# Patient Record
Sex: MALE | Race: WHITE
[De-identification: names, ages, dates, MRNs, and addresses within clinical notes are randomized per-mention and may not be internally consistent; named-entity substitution may affect disease eponyms.]

---

## 2017-08-05 ENCOUNTER — HOSPITAL ENCOUNTER (EMERGENCY)
Dept: HOSPITAL 80 - CED | Age: 67
Discharge: HOME | End: 2017-08-05
Payer: COMMERCIAL

## 2017-08-05 VITALS
OXYGEN SATURATION: 96 % | TEMPERATURE: 98.2 F | SYSTOLIC BLOOD PRESSURE: 132 MMHG | DIASTOLIC BLOOD PRESSURE: 79 MMHG | RESPIRATION RATE: 18 BRPM | HEART RATE: 78 BPM

## 2017-08-05 DIAGNOSIS — I10: ICD-10-CM

## 2017-08-05 DIAGNOSIS — R53.83: Primary | ICD-10-CM

## 2017-08-05 LAB
% IMMATURE GRANULYOCYTES: 0.2 % (ref 0–1.1)
ABSOLUTE IMMATURE GRANULOCYTES: 0.01 10^3/UL (ref 0–0.1)
ABSOLUTE NRBC COUNT: 0 10^3/UL (ref 0–0.01)
ADD DIFF?: NO
ADD MORPH?: NO
ADD SCAN?: NO
ANION GAP SERPL CALC-SCNC: 11 MEQ/L (ref 8–16)
ATYPICAL LYMPHOCYTE FLAG: 60 (ref 0–99)
BACTERIA #/AREA URNS HPF: (no result) /HPF
CALCIUM SERPL-MCNC: 8.7 MG/DL (ref 8.5–10.4)
CHLORIDE SERPL-SCNC: 104 MEQ/L (ref 97–110)
CO2 SERPL-SCNC: 24 MEQ/L (ref 22–31)
COLOR UR: YELLOW
CREAT SERPL-MCNC: 0.8 MG/DL (ref 0.7–1.3)
ERYTHROCYTE [DISTWIDTH] IN BLOOD BY AUTOMATED COUNT: 12.3 % (ref 11.5–15.2)
FRAGMENT RBC FLAG: 0 (ref 0–99)
GFR SERPL CREATININE-BSD FRML MDRD: > 60 ML/MIN/{1.73_M2}
GLUCOSE SERPL-MCNC: 111 MG/DL (ref 70–100)
HCT VFR BLD CALC: 43.5 % (ref 40–51)
HGB BLD-MCNC: 15.1 G/DL (ref 13.7–17.5)
LEFT SHIFT FLG: 0 (ref 0–99)
LIPEMIA HEMOLYSIS FLAG: 90 (ref 0–99)
MCH RBC BLDCO QN: 29.4 PG (ref 27.9–34.1)
MCHC RBC AUTO-ENTMCNC: 34.7 G/DL (ref 32.4–36.7)
MCV RBC AUTO: 84.8 FL (ref 81.5–99.8)
MUCOUS THREADS #/AREA URNS LPF: (no result) /LPF
NITRITE UR QL STRIP: NEGATIVE
NRBC-AUTO%: 0 % (ref 0–0.2)
PH UR STRIP: 5 [PH] (ref 5–7.5)
PLATELET # BLD: 246 10^3/UL (ref 150–400)
PLATELET CLUMPS FLAG: 10 (ref 0–99)
PMV BLD AUTO: 9.5 FL (ref 8.7–11.7)
POTASSIUM SERPL-SCNC: 4.3 MEQ/L (ref 3.5–5.2)
RBC # BLD AUTO: 5.13 10^6/UL (ref 4.4–6.38)
RBC #/AREA URNS HPF: (no result) /HPF (ref 0–3)
SODIUM SERPL-SCNC: 139 MEQ/L (ref 134–144)
SP GR UR STRIP: <= 1.005 (ref 1–1.03)
TROPONIN I SERPL-MCNC: < 0.012 NG/ML (ref 0–0.03)
WBC #/AREA URNS HPF: (no result) /HPF (ref 0–3)

## 2017-08-05 NOTE — CPEKG
Heart Rate: 78

RR Interval: 769

P-R Interval: 152

QRSD Interval: 92

QT Interval: 412

QTC Interval: 470

P Axis: 68

QRS Axis: 66

T Wave Axis: 31

EKG Severity - OTHERWISE NORMAL ECG -

EKG Impression: SINUS RHYTHM

EKG Impression: ATRIAL PREMATURE COMPLEX

Electronically Signed By: Jay Pascual 05-Aug-2017 13:31:08

## 2017-08-05 NOTE — EDPHY
H & P


Time Seen by Provider: 08/05/17 12:45


HPI/ROS: 





CHIEF COMPLAINT:  Fatigue





HISTORY OF PRESENT ILLNESS:  Patient had just been feeling much more tired over 

the last 2 weeks.  Feels achy is no energy and symptoms are worsening and 

severe now.  Associated with a feeling of having intermittent low-grade fever, 

intermittent nausea last night which returned.  No vomiting or diarrhea.  No 

skin rash. No double vision or sore throat or dental problems.  No chest pain 

or abdominal pain.





REVIEW OF SYSTEMS:


Eye: no change in vision


ENT: no sore throat


Cardiac: no chest pain or syncope


Pulmonary: no cough or SOB


Abdomen: no vomiting, diarrhea, abdominal pain


Musculoskeletal:  Back pain ongoing


Skin: no rash


Neuro:  HPI no weakness or numbness in extremities, no trouble with balance or 

strength or sensation.  Mild headache, only today.


Constitutional:  HPI 


: no urinary symptoms





A comprehensive 10 point review of systems is otherwise negative aside from 

elements mentioned in the history of present illness.





PAST MEDICAL HISTORY:  Hypertension and high cholesterol.  3 years ago he had 

angiogram in [place], which showed nonobstructive coronary disease.





Social history:  Moved to [place] from [place] 2 years ago, last foreign 

travel was [place] in March of this year.  No known tick exposure, was recently 

in [place].





General Appearance: Alert and conversant, cooperative.


Eyes: No scleral  icterus. 


ENT, Mouth: Normal mucous membranes.  Normal pharynx, no trismus or gum 

swelling.


Respiratory: Normal respiratory effort, breath sounds equal, lungs are clear to 

auscultation.  No rales or rhonchi.


Cardiovascular:  Regular rate and rhythm. No murmur.


Gastrointestinal:  Abdomen is soft and non tender.


Neurological: Alert and oriented x3.   Normally conversant. Face symmetric, 

normal movement and sensation in all extremities.


Skin: Warm and dry, no rashes.


Musculoskeletal: No peripheral edema and no joint swelling. No meningeal signs, 

normal range of motion of the neck.


Psychiatric: Not agitated.





Emergency Department course/MDM:


Chest x-ray, multiple lab tests, UA.  EKG.  Short of breath at night, pretest 

probability for pulmonary embolism low likelihood.


Discussed with TRAVIS Watts for Dr. Santacruz, will add on TSH and West Nile AB and PCP 

office will followup with him and the results of those tests.


1410: results and f/u plan discussed with patient and family.  Discussed that 

no definitive diagnosis established today, I think it is safe for him to follow 

up with his primary care physician early next week.


Smoking Status: Never smoked


Constitutional: 


 Initial Vital Signs











Temperature (C)  37.5 C   08/05/17 12:41


 


Heart Rate  88   08/05/17 12:41


 


Respiratory Rate  16   08/05/17 12:41


 


Blood Pressure  136/87 H  08/05/17 12:41


 


O2 Sat (%)  94   08/05/17 12:41








 











O2 Delivery Mode               Room Air














Allergies/Adverse Reactions: 


 





No Known Allergies Allergy (Unverified 08/05/17 12:45)


 








Home Medications: 














 Medication  Instructions  Recorded


 


amLODIPine BESYLATE  08/05/17














Medical Decision Making





- Diagnostics


EKG Interpretation: 





12-lead EKG interpreted by me; official reading is in trace master.  My 

interpretation is sinus rhythm with atrial premature complex, no ischemic 

changes, rate 78.


Imaging Results: 


 Imaging Impressions





Chest X-Ray  08/05/17 12:58


Impression: Peribronchial thickening suggests airways disease. Lingular segment 

opacity could reflect atelectasis or superimposed pneumonia.








Chest x-ray shows scarring, patient states that he has been evaluated for this 

before, I think it is likely old.


Imaging: I viewed and interpreted images myself


Differential Diagnosis: 





Differential considered including but not limited to viral syndrome, meningitis

, pneumonia, acute coronary syndrome, anemia or leukemia, other metabolic.





- Data Points


Laboratory Results: 


 Laboratory Results





 08/05/17 13:08 





 08/05/17 13:08 





 











  08/05/17 08/05/17 08/05/17





  13:10 13:08 13:08


 


WBC      





    


 


RBC      





    


 


Hgb      





    


 


Hct      





    


 


MCV      





    


 


MCH      





    


 


MCHC      





    


 


RDW      





    


 


Plt Count      





    


 


MPV      





    


 


Neut % (Auto)      





    


 


Lymph % (Auto)      





    


 


Mono % (Auto)      





    


 


Eos % (Auto)      





    


 


Baso % (Auto)      





    


 


Nucleat RBC Rel Count      





    


 


Absolute Neuts (auto)      





    


 


Absolute Lymphs (auto)      





    


 


Absolute Monos (auto)      





    


 


Absolute Eos (auto)      





    


 


Absolute Basos (auto)      





    


 


Absolute Nucleated RBC      





    


 


Immature Gran %      





    


 


Immature Gran #      





    


 


D-Dimer      0.37 ug/mLFEU ug/mLFEU





     (0.00-0.50) 


 


Sodium      





    


 


Potassium      





    


 


Chloride      





    


 


Carbon Dioxide      





    


 


Anion Gap      





    


 


BUN      





    


 


Creatinine      





    


 


Estimated GFR      





    


 


Glucose      





    


 


Calcium      





    


 


Creatine Kinase      





    


 


Troponin I      





    


 


NT-Pro-B Natriuret Pep      





    


 


TSH  Pending     





    


 


Urine Color    YELLOW   





    


 


Urine Appearance    CLEAR   





    


 


Urine pH    5.0   





    (5.0-7.5)  


 


Ur Specific Gravity    <= 1.005   





    (1.002-1.030)  


 


Urine Protein    NEGATIVE   





    (NEGATIVE)  


 


Urine Ketones    NEGATIVE   





    (NEGATIVE)  


 


Urine Blood    TRACE  H   





    (NEGATIVE)  


 


Urine Nitrate    NEGATIVE   





    (NEGATIVE)  


 


Urine Bilirubin    NEGATIVE   





    (NEGATIVE)  


 


Urine Urobilinogen    0.2 EU EU  





    (0.2-1.0)  


 


Ur Leukocyte Esterase    NEGATIVE   





    (NEGATIVE)  


 


Urine RBC    0-1 /hpf /hpf  





    (0-3)  


 


Urine WBC    NONE SEEN /hpf /hpf  





    (0-3)  


 


Ur Epithelial Cells    TRACE /lpf /lpf  





    (NONE-1+)  


 


Urine Bacteria    TRACE /hpf H /hpf  





    (NONE SEEN)  


 


Urine Mucus    TRACE /lpf /lpf  





    (NONE-1+)  


 


Urine Glucose    NEGATIVE   





    (NEGATIVE)  














  08/05/17 08/05/17





  13:08 13:08


 


WBC    4.84 10^3/uL 10^3/uL





    (3.80-9.50) 


 


RBC    5.13 10^6/uL 10^6/uL





    (4.40-6.38) 


 


Hgb    15.1 g/dL g/dL





    (13.7-17.5) 


 


Hct    43.5 % %





    (40.0-51.0) 


 


MCV    84.8 fL fL





    (81.5-99.8) 


 


MCH    29.4 pg pg





    (27.9-34.1) 


 


MCHC    34.7 g/dL g/dL





    (32.4-36.7) 


 


RDW    12.3 % %





    (11.5-15.2) 


 


Plt Count    246 10^3/uL 10^3/uL





    (150-400) 


 


MPV    9.5 fL fL





    (8.7-11.7) 


 


Neut % (Auto)    55.8 % %





    (39.3-74.2) 


 


Lymph % (Auto)    34.9 % %





    (15.0-45.0) 


 


Mono % (Auto)    6.4 % %





    (4.5-13.0) 


 


Eos % (Auto)    2.3 % %





    (0.6-7.6) 


 


Baso % (Auto)    0.4 % %





    (0.3-1.7) 


 


Nucleat RBC Rel Count    0.0 % %





    (0.0-0.2) 


 


Absolute Neuts (auto)    2.70 10^3/uL 10^3/uL





    (1.70-6.50) 


 


Absolute Lymphs (auto)    1.69 10^3/uL 10^3/uL





    (1.00-3.00) 


 


Absolute Monos (auto)    0.31 10^3/uL 10^3/uL





    (0.30-0.80) 


 


Absolute Eos (auto)    0.11 10^3/uL 10^3/uL





    (0.03-0.40) 


 


Absolute Basos (auto)    0.02 10^3/uL 10^3/uL





    (0.02-0.10) 


 


Absolute Nucleated RBC    0.00 10^3/uL 10^3/uL





    (0-0.01) 


 


Immature Gran %    0.2 % %





    (0.0-1.1) 


 


Immature Gran #    0.01 10^3/uL 10^3/uL





    (0.00-0.10) 


 


D-Dimer    





   


 


Sodium  139 mEq/L mEq/L  





   (134-144)  


 


Potassium  4.3 mEq/L mEq/L  





   (3.5-5.2)  


 


Chloride  104 mEq/L mEq/L  





   ()  


 


Carbon Dioxide  24 mEq/l mEq/l  





   (22-31)  


 


Anion Gap  11 mEq/L mEq/L  





   (8-16)  


 


BUN  13 mg/dL mg/dL  





   (7-23)  


 


Creatinine  0.8 mg/dL mg/dL  





   (0.7-1.3)  


 


Estimated GFR  > 60   





   


 


Glucose  111 mg/dL H mg/dL  





   ()  


 


Calcium  8.7 mg/dL mg/dL  





   (8.5-10.4)  


 


Creatine Kinase  79 IU/L IU/L  





   (0-224)  


 


Troponin I  < 0.012 ng/mL ng/mL  





   (0-0.034)  


 


NT-Pro-B Natriuret Pep  131 pg/mL H pg/mL  





   (0-125)  


 


TSH    





   


 


Urine Color    





   


 


Urine Appearance    





   


 


Urine pH    





   


 


Ur Specific Gravity    





   


 


Urine Protein    





   


 


Urine Ketones    





   


 


Urine Blood    





   


 


Urine Nitrate    





   


 


Urine Bilirubin    





   


 


Urine Urobilinogen    





   


 


Ur Leukocyte Esterase    





   


 


Urine RBC    





   


 


Urine WBC    





   


 


Ur Epithelial Cells    





   


 


Urine Bacteria    





   


 


Urine Mucus    





   


 


Urine Glucose    





   











Medications Given: 


 








Discontinued Medications





Sodium Chloride (Ns)  1,000 mls @ 0 mls/hr IV ONCE ONE


   PRN Reason: Wide Open


   Stop: 08/05/17 12:53


   Last Admin: 08/05/17 13:04 Dose:  1,000 mls








Departure





- Departure


Disposition: Home, Routine, Self-Care


Clinical Impression: 


Fatigue


Qualifiers:


 Fatigue type: unspecified Qualified Code(s): R53.83 - Other fatigue





Condition: Good


Instructions:  Fatigue (ED)


Additional Instructions: 


You have a TSH (thyroid test) and West Nile pending; you can call in 48-72 

hours for results to [phone]; also Dr. Santacruz's office will be following up 

on these results as well.


Referrals: 


Lou Santacruz MD [Primary Care Provider] - As per Instructions

## 2017-08-06 ENCOUNTER — HOSPITAL ENCOUNTER (EMERGENCY)
Dept: HOSPITAL 80 - CED | Age: 67
LOS: 1 days | Discharge: HOME | End: 2017-08-07
Payer: COMMERCIAL

## 2017-08-06 DIAGNOSIS — E86.9: ICD-10-CM

## 2017-08-06 DIAGNOSIS — I10: ICD-10-CM

## 2017-08-06 DIAGNOSIS — G44.89: Primary | ICD-10-CM

## 2017-08-06 DIAGNOSIS — B34.9: ICD-10-CM

## 2017-08-06 PROCEDURE — 96374 THER/PROPH/DIAG INJ IV PUSH: CPT

## 2017-08-06 PROCEDURE — 99284 EMERGENCY DEPT VISIT MOD MDM: CPT

## 2017-08-06 PROCEDURE — 96361 HYDRATE IV INFUSION ADD-ON: CPT

## 2017-08-06 PROCEDURE — 96376 TX/PRO/DX INJ SAME DRUG ADON: CPT

## 2017-08-06 NOTE — EDPHY
H & P


Stated Complaint: ongoing headache for 2 weeks,getting worse


Time Seen by Provider: 08/06/17 22:06


HPI/ROS: 





CHIEF COMPLAINT:  Headache and malaise





HISTORY OF PRESENT ILLNESS:  This is a generally healthy 67-year-old male who 

was seen for similar complaints yesterday.  He returns today complaining of 

worsening headache.  Headache is global, a dull sensation.  He has been ill for 

the past 2 weeks with fever, fatigue and myalgias.  Yesterday he developed a 

mild headache.  Today the headache has worsened.  He has been taking Tylenol 

and ibuprofen every 6 hours with no lasting relief.  He has been febrile at home

; mostly "low-grade" temperatures but one temperature recorded today of 100.4 

F. he has not experienced confusion, difficulty with speech, new numbness, new 

weakness, trouble walking, change in vision, or difficulty swallowing.  He has 

not been out of the country.  He did spend a month in [place] not long ago.  He 

is outside a great deal and has been bitten by mosquitoes since returning to 

[place].  He has not had rash.  He denies any respiratory symptoms, abdominal 

pain, diarrhea, constipation, or urinary symptoms.





REVIEW OF SYSTEMS:





A ten point review of systems was performed and is negative with the exception 

of the items mentioned in the HPI.








Source: Patient, Family


Exam Limitations: No limitations





- Personal History


Current Tetanus Diphtheria and Acellular Pertussis (TDAP): Yes





- Medical/Surgical History


Hx Asthma: No


Hx Chronic Respiratory Disease: No


Hx Diabetes: No


Hx Cardiac Disease: No


Hx Renal Disease: No


Hx Cirrhosis: No


Hx Alcoholism: No


Hx HIV/AIDS: No


Hx Splenectomy or Spleen Trauma: No


Other PMH: HTN.  high cholesterol





- Social History


Smoking Status: Never smoked


Additional Social History: 





He lives with his wife for 47 years.  Worked as a contractor.





- Physical Exam


Exam: 





General Appearance:  Alert.  Vital signs reviewed.  Blood pressure 152/90.  

Temperature 37.8.


Eyes:  Pupils equal and round, no conjunctival injection, no discharge. 

Anicteric.


ENT, Mouth:  Mucous membranes are moist, no oropharyngeal erythema or edema.


Neck:  No lymphadenopathy, supple.  No meningeal signs.


Respiratory:  Lungs are clear to auscultation; no wheezes, rales, or rhonchi.


Cardiovascular:  Regular rate and rhythm; no murmur, rub, or gallop.


Gastrointestinal:  Abdomen is soft and nontender, no masses or organomegaly, 

bowel sounds normal.


Skin:  Warm and dry, no rashes on exposed skin, normal color.


Back:  Nontender to palpation over the thoracolumbar spine. No CVAT.


Extremities:  No lower extremity edema, no calf tenderness or swelling.


Neurological:  Alert and oriented.  Moving all four extremities easily and 

equally.


Cranial nerves II through XII are examined and are intact (visual acuity not 

tested).  Strength is 5 over 5 bilaterally with testing of all major motor 

groups.  Sensation is intact to light touch over all 4 extremities.  Deep 

tendon reflexes are 2+ in the biceps and knees bilaterally.  Gait is normal.  

Finger-to-nose is performed accurately.


Psychiatric:  Normal affect.


Constitutional: 


 Initial Vital Signs











Temperature (C)  37.8 C   08/06/17 22:07


 


Heart Rate  87   08/06/17 22:07


 


Respiratory Rate  16   08/06/17 22:07


 


Blood Pressure  152/90 H  08/06/17 22:07


 


O2 Sat (%)  93   08/06/17 22:07








 











O2 Delivery Mode               Room Air














Allergies/Adverse Reactions: 


 





No Known Allergies Allergy (Verified 08/07/17 07:09)


 








Home Medications: 














 Medication  Instructions  Recorded


 


amLODIPine BESYLATE [Norvasc 5 mg 5 mg PO HS 08/05/17





(*)]  


 


Acyclovir 400 mg PO DAILY PRN 08/07/17


 


Atorvastatin Calcium [Lipitor 20 20 mg PO HS 08/07/17





mg (*)]  


 


Herbals/Supplements -Info Only 1 ea PO DAILY 08/07/17














Medical Decision Making


ED Course/Re-evaluation: 





Blood work was performed yesterday.  West Nile testing is pending.





We discussed lumbar puncture.  I do not think that this is bacterial meningitis 

but viral meningitis is a possibility.  He does not have any meningeal signs. 

He understands that viral meningitis would be treated symptomatically.  I do 

not suspect subarachnoid hemorrhage.  We talked about the risks and benefits of 

a lumbar puncture in this situation.  At this point in time he prefers to 

forego this procedure.  He understands that the diagnosis remains unclear.  He 

is able to make his own medical decisions.  We discussed the risks and the 

benefits of lumbar puncture.





His neurologic exam is normal.  His mental status is normal.  There is no 

evidence of encephalopathy.





His main concern tonight is of continued headache and lack of sleep.  He is 

mostly interested in pain control and understands that this is likely a viral 

process.  He will be given 1 L IV normal saline and Dilaudid 0.5 mg IV.





With IV fluids and pain medication he felt better, while enough to return home.

  He is discharged in good condition.  Danger signs were reviewed with both the 

patient and his wife.


Differential Diagnosis: 





I considered a differential diagnosis of headache including but not limited to 

subarachnoid hemorrhage, migraine headache, tension headache and infectious 

causes such as meningitis, pharyngitis and sinusitis.





- Data Points


Medications Given: 


 








Discontinued Medications





Hydrocodone Bitart/Acetaminophen (Norco 5/325mg Prepack#6)  1 btl TAKEHOME 

EDNOW ONE


   Stop: 08/06/17 22:38


   Last Admin: 08/06/17 23:08 Dose:  1 btl


Hydromorphone HCl (Dilaudid)  0.5 mg IVP EDNOW ONE


   Stop: 08/06/17 22:36


   Last Admin: 08/06/17 22:55 Dose:  0.5 mg


Hydromorphone HCl (Dilaudid)  0.5 mg IVP EDNOW ONE


   Stop: 08/06/17 23:36


   Last Admin: 08/06/17 23:48 Dose:  0.5 mg


Sodium Chloride (Ns)  1,000 mls @ 0 mls/hr IV EDNOW ONE; Wide Open


   PRN Reason: Protocol


   Stop: 08/06/17 22:37


   Last Admin: 08/06/17 22:50 Dose:  1,000 mls








Departure





- Departure


Disposition: Home, Routine, Self-Care


Clinical Impression: 


 Viral syndrome





Headache


Qualifiers:


 Headache type: other headache syndrome Qualified Code(s): G44.89 - Other 

headache syndrome





Condition: Good


Instructions:  Acute Headache (ED), Viral Syndrome (ED)


Additional Instructions: 


Adult Pain & Fever Control:


We recommend Acetaminophen (Tylenol) and Ibuprofen (Motrin,Advil) for pain and 

fever control.  When fever is high or pain severe, both drugs can be used at 

the same time, but at different intervals.  Please note the time differences.  


Your dose is:     Acetaminophen [650]mg every 4 to 6 hours


        Ibuprofen [600]mg every [6] hours with food   OR


          


Note:  do not take Acetaminophen with Hydrocodone (Vicodin, Lortab) or Oycodone 

(Percocet).  These medications also contain Acetaminophen.  


No more than 3000mg of Acetaminophen should be taken in 24 hours (for an adult).





Please watch your overall tylenol dose.





Call Dr. Santacruz's office first thing in the morning to arrange to be seen.  Let 

the office staff know that you have been to the emergency department two days 

in a row.





If you are worse--confusion, vomiting, new weakness, new numbness, trouble 

speaking, any new or concerning symptoms--you should be re-evaluated 

immediately (Call 911).  








Referrals: 


Lou Santacruz MD [Primary Care Provider] - As per Instructions

## 2017-08-07 ENCOUNTER — HOSPITAL ENCOUNTER (INPATIENT)
Dept: HOSPITAL 80 - FED | Age: 67
LOS: 7 days | Discharge: HOME HEALTH | DRG: 866 | End: 2017-08-14
Attending: INTERNAL MEDICINE | Admitting: INTERNAL MEDICINE
Payer: COMMERCIAL

## 2017-08-07 VITALS
TEMPERATURE: 99.1 F | OXYGEN SATURATION: 92 % | HEART RATE: 72 BPM | RESPIRATION RATE: 12 BRPM | SYSTOLIC BLOOD PRESSURE: 130 MMHG | DIASTOLIC BLOOD PRESSURE: 72 MMHG

## 2017-08-07 DIAGNOSIS — A92.31: Primary | ICD-10-CM

## 2017-08-07 DIAGNOSIS — I10: ICD-10-CM

## 2017-08-07 DIAGNOSIS — E78.5: ICD-10-CM

## 2017-08-07 DIAGNOSIS — J98.11: ICD-10-CM

## 2017-08-07 LAB
% IMMATURE GRANULYOCYTES: 0.3 % (ref 0–1.1)
ABSOLUTE IMMATURE GRANULOCYTES: 0.03 10^3/UL (ref 0–0.1)
ABSOLUTE NRBC COUNT: 0 10^3/UL (ref 0–0.01)
ADD DIFF?: NO
ADD MORPH?: NO
ADD SCAN?: NO
ANION GAP SERPL CALC-SCNC: 9 MEQ/L (ref 8–16)
APPEARANCE CSF: CLEAR
APPEARANCE CSF: CLEAR
APTT BLD: 26.3 SEC (ref 23–38)
ATYPICAL LYMPHOCYTE FLAG: 30 (ref 0–99)
CALCIUM SERPL-MCNC: 8.8 MG/DL (ref 8.5–10.4)
CHLORIDE SERPL-SCNC: 103 MEQ/L (ref 97–110)
CO2 SERPL-SCNC: 25 MEQ/L (ref 22–31)
COLOR CSF: COLORLESS
COLOR CSF: COLORLESS
CREAT SERPL-MCNC: 0.9 MG/DL (ref 0.7–1.3)
CSF SUPERNATANT: COLORLESS
CSF SUPERNATANT: COLORLESS
ERYTHROCYTE [DISTWIDTH] IN BLOOD BY AUTOMATED COUNT: 12.3 % (ref 11.5–15.2)
FRAGMENT RBC FLAG: 0 (ref 0–99)
GFR SERPL CREATININE-BSD FRML MDRD: > 60 ML/MIN/{1.73_M2}
GLUCOSE SERPL-MCNC: 123 MG/DL (ref 70–100)
HCT VFR BLD CALC: 42 % (ref 40–51)
HGB BLD-MCNC: 14.7 G/DL (ref 13.7–17.5)
INR PPP: 1.14 (ref 0.83–1.16)
LEFT SHIFT FLG: 0 (ref 0–99)
LIPEMIA HEMOLYSIS FLAG: 90 (ref 0–99)
MCH RBC BLDCO QN: 30.1 PG (ref 27.9–34.1)
MCHC RBC AUTO-ENTMCNC: 35 G/DL (ref 32.4–36.7)
MCV RBC AUTO: 85.9 FL (ref 81.5–99.8)
NRBC-AUTO%: 0 % (ref 0–0.2)
PLATELET # BLD: 255 10^3/UL (ref 150–400)
PLATELET CLUMPS FLAG: 0 (ref 0–99)
PMV BLD AUTO: 9.5 FL (ref 8.7–11.7)
POTASSIUM SERPL-SCNC: 4.1 MEQ/L (ref 3.5–5.2)
PROTHROMBIN TIME: 14.5 SEC (ref 12–15)
RBC # BLD AUTO: 4.89 10^6/UL (ref 4.4–6.38)
SODIUM SERPL-SCNC: 137 MEQ/L (ref 134–144)

## 2017-08-07 PROCEDURE — 009U3ZX DRAINAGE OF SPINAL CANAL, PERCUTANEOUS APPROACH, DIAGNOSTIC: ICD-10-PCS | Performed by: RADIOLOGY

## 2017-08-07 RX ADMIN — SODIUM CHLORIDE SCH MLS: 900 INJECTION, SOLUTION INTRAVENOUS at 22:00

## 2017-08-07 RX ADMIN — ATORVASTATIN CALCIUM SCH MG: 20 TABLET, FILM COATED ORAL at 20:54

## 2017-08-07 RX ADMIN — SODIUM CHLORIDE SCH MLS: 900 INJECTION, SOLUTION INTRAVENOUS at 16:57

## 2017-08-07 RX ADMIN — ACETAMINOPHEN PRN MG: 500 TABLET ORAL at 21:03

## 2017-08-07 RX ADMIN — KETOROLAC TROMETHAMINE PRN MG: 15 INJECTION, SOLUTION INTRAMUSCULAR; INTRAVENOUS at 20:55

## 2017-08-07 NOTE — GCON
[f 
rep st]



                                                                    CONSULTATION





INFECTIOUS DISEASES CONSULTATION



REFERRING PHYSICIAN:  Manny Solitario MD



REASON FOR CONSULTATION:  Meningitis.



HISTORY OF PRESENT ILLNESS:  The patient is a 67-year-old male with a past 
medical history of hypertension, hypercholesterolemia, who I am asked to see in 
consultation for meningitis.  The complains developing nausea, fatigue, and 
malaise toward the end of July.  He had intermittent low-grade temperatures 
primarily in the 99 degree range.  Over the last 3 days, he developed abrupt 
onset of severe headache.  He did not obtain any relief with Tylenol or Advil.  
He did have mild associated nausea and threw up this morning.  Headache was 
such that he sought care in the emergency department beginning on 08/05/2017.  
Evaluation at that time showed normal CBC and chemistry profile.  Findings were 
felt to be most compatible with a viral syndrome.  West Nile virus antibodies 
were obtained, and the patient was advised to follow up with his PCP.  He 
continued to have symptoms, and returned to the Emergency Department on 08/06/
2017 with similar symptoms, but persistent and worsening headache.  His 
temperature at home yesterday was noted to be 100.4.  Lumbar puncture was 
discussed with the patient, and the patient prefer to forego LP at that point 
in time with working diagnosis of viral syndrome.  Today, he had persisting 
headache with nausea and 1 episode of vomiting prompting return to Emergency 
Department.  He underwent lumbar puncture earlier today with CSF showing 84 
white blood cells, 14 red blood cells, 88% neutrophils with glucose 69, and 
protein 39.  The patient does note that he has had mosquito exposure, both in 
[place] and in [place] where he spent the month of July.  He did not note any 
tick exposure.  He did spend time in a reservoir while in [place].  No exposure 
to dead animals.  Traveled to [place] in March without incident.  Does have 
grandchildren whom he was with in [place].  The patient has not noted skin rash
, visual change, sore throat, cough, shortness of breath, diarrhea, urinary 
tract symptoms, myalgias or arthralgias.  He has had associated chills.  His 
appetite has been decreased, but he has been able to tolerate p.o. intake.  The 
patient received Phenergan, Dilaudid and morphine in the Emergency Department 
and now feels clinically improved.  Based on the patient's persistent headache 
and findings of meningitis, he has now been admitted for further care.



PAST MEDICAL HISTORY:  The patient does have cold sores periodically; these are 
normally prevented by taking at time of trigger such as sun or wind exposure, 
hypertension, hypercholesterolemia.



PAST SURGICAL HISTORY:  Shoulder surgery, hernia repair.



CURRENT MEDICATIONS:  Norvasc 5 mg p.o. at bedtime, Lipitor 20 mg p.o. at 
bedtime, Lovenox 40 mg subcu daily, Toradol, morphine, Compazine, Phenergan as 
needed.



ALLERGIES:  No known drug allergies.



SOCIAL HISTORY:  The patient does not smoke.  He drinks 1 beer 4-5 times per 
week.  Traveled to [place] as above, mosquito exposure as above.



FAMILY HISTORY:  Hypertension.



REVIEW OF SYSTEMS:  Outside that noted in the HPI, remainder of 10 system 
review is unremarkable.



PHYSICAL EXAMINATION:  VITAL SIGNS:  Temperature maximum 38.3, temperature 
current 37.8, heart rate 89, respiratory rate 18, blood pressure 107/63, oxygen 
saturation 90% on room air.  GENERAL:  The patient is well nourished, well 
developed, in no acute distress.  He appears nontoxic.  HEENT:  There is no 
scleral icterus, conjunctival injection, or conjunctival petechiae.  Oropharynx 
shows no lesions with moist mucous membranes.  Dentition is in fair repair.  
There is no nasal discharge.  There is no tenderness over the frontal, 
maxillary or mastoid areas.  NECK:  Supple without lymphadenopathy or palpable 
thyromegaly.  No meningismus.  CHEST:  Clear to auscultation bilaterally 
without adventitious sounds.  Respiratory effort is normal.  CARDIOVASCULAR:  
Regular rate and rhythm without murmurs, gallops, or rubs.  ABDOMEN:  Soft, 
nontender, nondistended.  There is no palpable organomegaly.  Bowel sounds are 
present.  MUSCULOSKELETAL:  No cyanosis, clubbing, or edema.  SKIN:  No rashes 
present.  No stigmata of endocarditis.  SKIN:  Warm and dry to touch.  
NEUROLOGIC:  The patient is alert and interacts appropriately with examiner.  
Cranial nerves 2-12 are grossly intact.  Sensation is grossly intact.  Motor 
function is intact.  Cerebellar function is intact by finger-nose testing.  The 
patient is able to name stethoscope and ID badge without difficulty.



LABORATORY DATA:  White blood cell count 9.3, hematocrit 42.0, platelets 255, 
neutrophils 84%.  Serum creatinine is 0.9, venous lactate is 1.0, INR 1.1.  
Urinalysis on 08/05/2017 showed 0-1 red blood cells and no white blood cells.  
CSF findings outlined in history of present illness.  Gram stain is negative 
with culture pending.  Blood cultures x2 are pending.  West Nile virus 
antibodies from 08/05/2017 on serum are pending.  CSF, HSV 1, and 2 PCRs are 
pending.  CSF, West Nile virus antibodies are pending.  



Head CT without contrast shows no abnormalities.  Chest x-ray shows 
hypoventilatory changes.



IMPRESSION:  Meningitis:  Clinical presentation and CSF findings consistent 
with meningitis.  The patient has a neutrophilic predominance, although 
absolute CSF count is less than 100.  Suspect this is most likely viral in 
etiology despite neutrophilic predominance.  This can typically be seen with 
enterovirus, which is more common in late summer.  Doubt this represents 
bacterial meningitis based on clinical course and degree of pleocytosis.  Other 
viral etiologies such as HSV, VZV, or West Nile virus also considerations.  No 
clinical findings of encephalitis on examination.



RECOMMENDATIONS:  

1.  Observe off antibiotics and antivirals.

2.  Add to CSF enterovirus PCR and VZV PCR.

3.  Followup CSF studies as available.

4.  Follow up blood cultures as available.

5.  Continue supportive care for headache and nausea.  



Thank you for this consultation.  We will continue to follow the patient with 
you.





Job #:  [id number]

MTDD

## 2017-08-07 NOTE — EDPHY
H & P


Stated Complaint: Fever, h/a ~ 2wks;at [facility] x 2 as well as PCP's office.


HPI/ROS: 





CHIEF COMPLAINT: Persistent headache, fever





HISTORY OF PRESENT ILLNESS: The patient is a 68 y/o male returning to the ED 

for the 3rd time in the last 3 days with a worsening headache.  He's had 

intermittent nausea, fever, and fatigue for the last 2 weeks.  2 days ago he 

developed a headache that has not improved and developed dry heaving today. He 

rates his headache at an 8/10 in severity.  The HA was alleviated temporarily 

by Dilaudid that he received in the ED yesterday. He declined a lumbar puncture 

yesterday for evaluation of meningitis. He denies cough, chest pain, dysuria, 

vision changes. He notes he's had dyspnea that wakes him from sleep for the 

last month. He recently traveled to [place] where he did have several mosquito 

bites, but denies any known tick bites or other infectious exposures. 





REVIEW OF SYSTEMS:


Constitutional: see HPI


Eyes: No visual changes


ENT: No sore throat


Respiratory: see HPI


Cardiac: No chest pain


Gastrointestinal: See HPI


Genitourinary: No hematuria, no dysuria


Musculoskeletal: No leg pain or swelling


Skin: No rash


Neurological: See HPI


Psychiatric: No depression





- Personal History


Current Tetanus Diphtheria and Acellular Pertussis (TDAP): Yes





- Medical/Surgical History


PMH: 





PMH includes:


1. Hypertension


2. Hyperlipidemia





Prior medical records reviewed including ED visit 8/6/17 for the same symptoms.


Hx Asthma: No


Hx Chronic Respiratory Disease: No


Hx Diabetes: No


Hx Cardiac Disease: No


Hx Renal Disease: No


Hx Cirrhosis: No


Hx Alcoholism: No


Hx HIV/AIDS: No


Hx Splenectomy or Spleen Trauma: No


Other PMH: HTN.  high cholesterol





- Social History


Smoking Status: Never smoked


Additional Social History: 





Wife at bedside. Nonsmoker.





- Physical Exam


Exam: 





General Appearance: Alert, appears uncomfortable


Eyes: Pupils equal and round, no conjunctival pallor or injection


ENT, Mouth: Mucous membranes moist


Neck: Normal inspection, supple


Respiratory: Lungs are clear to auscultation


Cardiovascular: Regular rate and rhythm 


Gastrointestinal:  Abdomen is soft and non-tender 


Neurological:  Alert, oriented x3, cranial nerves II through XII intact, motor 5

/5, sensory intact to light touch, normal gait


Skin:  Warm and dry, no rash


Extremities:  Nontender, no pedal edema


Psychiatric: Mood and affect normal





Constitutional: 


 Initial Vital Signs











Temperature (C)  37.1 C   08/07/17 07:10


 


Heart Rate  89   08/07/17 07:10


 


Respiratory Rate  16   08/07/17 07:10


 


Blood Pressure  120/81 H  08/07/17 07:10


 


O2 Sat (%)  92   08/07/17 07:10








 











O2 Delivery Mode               Nasal Cannula


 


O2 (L/minute)                  2














Allergies/Adverse Reactions: 


 





No Known Allergies Allergy (Verified 08/07/17 07:09)


 








Home Medications: 














 Medication  Instructions  Recorded


 


amLODIPine BESYLATE [Norvasc 5 mg 5 mg PO HS 08/05/17





(*)]  


 


Acyclovir 400 mg PO DAILY PRN 08/07/17


 


Atorvastatin Calcium [Lipitor 20 20 mg PO HS 08/07/17





mg (*)]  


 


Herbals/Supplements -Info Only 1 ea PO DAILY 08/07/17














Medical Decision Making





- Diagnostics


Imaging Results: 


CT scan of the brain:  No acute findings.


Chest x-ray:  NAD





Imaging: Discussed imaging studies w/ On call Radiologist, I viewed and 

interpreted images myself


Procedures: 





Procedure: Lumbar puncture


   Indication: headache


After verbal informed consent from patient explaining the risks including 

infection, bleeding, and neurologic damage, a lumbar puncture was performed 

after the patient was prepped and draped in the usual fashion. The back was 

anesthetized with 1% lidocaine. 2 attempts, unsuccessful, will need LP under 

flouroscopy. 





ED Course/Re-evaluation: 





This is a 68 y/o male who presents with a 3-day history of unimproved headache 

and 2 weeks of intermittent nausea, fever, and fatigue. He additionally has 

been experiencing dyspnea that wakes him from sleep for about 1 month. He was 

evaluated at the ED yesterday and his headache was treated symptomatically with 

some temporary relief. Apart from appearing uncomfortable, his exam is 

unremarkable. Vitals are within normal limits. Clinical presentation concerning 

for meningitis, a mosquito-bourne illness, or possibly have a structural CNS 

etiology. In addition to symptom management, I will repeat his chest x-ray due 

to possible early lingular infiltrate seen on prior x-ray. We also plan to 

perform a head CT to rule out structural cause of his symptoms. I discussed 

recommendation for a lumbar puncture if his imaging is negative to rule out 

meningitis. He agrees to this treatment plan. IV established. Labs drawn 

including CBC, CHEM, lactic acid, and cultures. 1mg IV Dilaudid, 4mg IV Zofran, 

and 1L IV NS administered for symptoms. 





Reassessed patient and discussed work up thus far. His is head CT is negative 

and his chest x-ray looks similar to yesterday. I recommended proceeding with 

the lumbar puncture to rule out meningitis, which he agrees to. Additional 4mg 

IV Zofran and 0.5mg IV Dilaudid administered for pain and nausea. 





Lumbar puncture attempted by me, though I was unsuccessful.  Discussed with the 

patient and his wife.  He will need to have LP under fluoroscopy.





9:30 a.m.-reassessment, severe headache has returned.  Awaiting LP under 

fluoroscopy.  Morphine 6 mg IV given.





Noon-reassessment, continues to have severe headache, still awaiting lumbar 

puncture.





1348:  Consulted with hospitalist service. Dr. Herrera accepts admission for 

intractable headache. LP is still pending. 





1339: Patient returned from fluoroscopy. He now has a fever of 38.3C and 

continues to have pain. 1000mg PO Tylenol administered for fever and 6mg IV 

morphine administered for pain.  CSF results consistent with meningitis, most 

likely viral in etiology.  No antibiotics given in the emergency department.  

The patient was stable throughout his emergency department stay.  The headache 

was difficult to control no required multiple doses of IV narcotics.  There is 

no evidence of severe sepsis.


Differential Diagnosis: 





Headache and fever including but not limited to pneumonia, subarachnoid 

hemorrhage, migraine headache, tension headache and infectious causes such as 

pharyngitis and sinusitis.





- Data Points


Laboratory Results: 


 Laboratory Results





 08/07/17 07:40 





 08/07/17 07:40 








Microbiology Results: 


 MICROBIOLOGY





08/07/17 10:27   Cerebral Spinal Fluid   Gram Stain - Final





Medications Given: 


 








Discontinued Medications





Acetaminophen (Tylenol)  650 mg PO EDNOW ONE


   Stop: 08/07/17 13:50


   Last Admin: 08/07/17 13:56 Dose:  650 mg


Hydromorphone HCl (Dilaudid)  1 mg IVP EDNOW ONE


   Stop: 08/07/17 07:31


   Last Admin: 08/07/17 07:37 Dose:  1 mg


Hydromorphone HCl (Dilaudid)  0.5 mg IVP EDNOW ONE


   Stop: 08/07/17 08:28


   Last Admin: 08/07/17 08:27 Dose:  0.5 mg


Sodium Chloride (Ns)  1,000 mls @ 0 mls/hr IV ONCE ONE; Wide [facility]


   PRN Reason: Protocol


   Stop: 08/07/17 07:31


   Last Admin: 08/07/17 07:39 Dose:  1,000 mls


Morphine Sulfate (Morphine)  6 mg IVP Q1H PRN


   PRN Reason: Pain, Severe Unable to Take PO


   Stop: 08/07/17 11:40


   Last Admin: 08/07/17 10:26 Dose:  6 mg


Morphine Sulfate (Morphine)  6 mg IVP EDNOW ONE


   Stop: 08/07/17 13:51


   Last Admin: 08/07/17 14:00 Dose:  6 mg


Ondansetron HCl (Zofran)  4 mg IVP EDNOW ONE


   Stop: 08/07/17 07:31


   Last Admin: 08/07/17 07:38 Dose:  4 mg


Ondansetron HCl (Zofran)  4 mg IVP EDNOW ONE


   Stop: 08/07/17 08:28


   Last Admin: 08/07/17 08:27 Dose:  4 mg


Promethazine HCl (Phenergan)  12.5 mg IVP ONCE ONE


   Stop: 08/07/17 13:51


   Last Admin: 08/07/17 13:57 Dose:  12.5 mg








Departure





- Departure


Disposition: [facility]


Clinical Impression: 


 Meningitis





Condition: Fair


Report Scribed for: Mera Franco


Report Scribed by: Melani Fletcher


Date of Report: 08/07/17


Time of Report: 07:29


Physician Review and Approval Statement: 





08/07/17 07:29


Portions of this note were transcribed by a medical scribe.  I personally 

performed a history, physical exam, medical decision making, and confirmed 

accuracy of information the transcribed note.

## 2017-08-07 NOTE — PDGENHP
History and Physical





- Chief Complaint


 acute headache





- History of Present Illness


 primary care provider:  Dr. Lou Santacruz





Primary pulmonologist:  Dr. Finley





HPI:  67-year-old male presenting with acute headache characterized as severe, 

located globally, 8/10 pain with associated dry heaves, fatigue, fever of 100.4 

F, with onset of symptoms 3 days ago and duration persistent worsening 

thereafter.  These symptoms in the context of approximately 2 weeks of diffuse 

fatigue, myalgias, shortness of breath exacerbated by lying supine as well as 

nonproductive cough.  Prior to the onset of those symptoms, the patient had 

otherwise been feeling well and he had been vacationing in [place].  Prior to 

the onset of symptoms, he had been physically active, ambulating and exercising 

well without any chest pain or shortness of breath.  During the past 3 days, 

the patient has been to the emergency department twice, and received Dilaudid, 

which transiently alleviated his headache.  He also received guidance to 

receive Tylenol and ibuprofen, which has not had any benefit.  In our emergency 

department, he received Phenergan and this seems to be alleviating his nausea, 

resulting in fatigue.





History Information





- Allergies/Home Medication List


Allergies/Adverse Reactions: 








No Known Allergies Allergy (Verified 08/07/17 07:09)


 





Home Medications: 








amLODIPine BESYLATE [Norvasc 5 mg (*)] 5 mg PO HS 08/05/17 [Last Taken 08/06/17]


Acyclovir 400 mg PO DAILY PRN 08/07/17 [Last Taken Unknown]


Atorvastatin Calcium [Lipitor 20 mg (*)] 20 mg PO HS 08/07/17 [Last Taken 08/06/ 17]


Herbals/Supplements -Info Only 1 ea PO DAILY 08/07/17 [Last Taken Unknown]





I have personally reviewed and updated: family history, medical history, social 

history, surgical history





- Past Medical History


hypertension, hyperlipidemia





- Surgical History


Additional surgical history: Shoulder, knee, Achilles





- Family History


Additional family history: no recent sick family contacts, mother with Alzheimer

's dementia





- Social History


Smoking Status: Never smoked


Alcohol Use: Other (regular drinker of beer, has not had an alcoholic beverage 

in 2 weeks)


Drug Use: None


Additional social history: contractor, physically active





Review of Systems


ROS: 10pt was reviewed & negative except for what was stated in HPI & below


Constitutional: Reports: fever, malaise, weakness


Respiratory: Reports: cough


Gastrointestinal: Reports: vomitting, nausea


Neurological: Reports: headache





Physical Exam














Temp Pulse Resp BP Pulse Ox


 


 37.8 C   89   18   107/63   90 L


 


 08/07/17 15:38  08/07/17 15:38  08/07/17 15:38  08/07/17 15:38  08/07/17 15:38











Constitutional: no apparent distress, appears nourished, uncomfortable, No 

chronically ill appearing, No unkempt


Eyes: PERRL, anicteric sclera, EOMI


Ears, Nose, Mouth, Throat: moist mucous membranes, hearing normal, ears appear 

normal, no oral mucosal ulcers, other ( no tonsillar exudate)


Cardiovascular: regular rate and rhythym, no murmur, rub, or gallop, No edema


Respiratory: no respiratory distress, no rales or rhonchi, clear to auscultation


Gastrointestinal: normoactive bowel sounds, soft, non-tender abdomen, no 

palpable masses, No distension


Skin: warm, normal color, No abrasion, No rash


Neurologic: AAOx3, sensation intact bilaterally, No weakness ( motor strength 5/

5 bilateral upper and lower extremities)


Psychiatric: not anxious, not encephalopathic, flat affect, No agitated


Lymph, Heme, Immunologic: other ( tender, 1 cm submandibular lymph nodes 

without any anterior or posterior cervical lymphadenopathy)





Lab Data & Imaging Review





 08/07/17 07:40





 08/07/17 07:40














WBC  9.32 10^3/uL (3.80-9.50)   08/07/17  07:40    


 


RBC  4.89 10^6/uL (4.40-6.38)   08/07/17  07:40    


 


Hgb  14.7 g/dL (13.7-17.5)   08/07/17  07:40    


 


Hct  42.0 % (40.0-51.0)   08/07/17  07:40    


 


MCV  85.9 fL (81.5-99.8)   08/07/17  07:40    


 


MCH  30.1 pg (27.9-34.1)   08/07/17  07:40    


 


MCHC  35.0 g/dL (32.4-36.7)   08/07/17  07:40    


 


RDW  12.3 % (11.5-15.2)   08/07/17  07:40    


 


Plt Count  255 10^3/uL (150-400)   08/07/17  07:40    


 


MPV  9.5 fL (8.7-11.7)   08/07/17  07:40    


 


Neut % (Auto)  83.5 % (39.3-74.2)  H  08/07/17  07:40    


 


Lymph % (Auto)  12.2 % (15.0-45.0)  L  08/07/17  07:40    


 


Mono % (Auto)  3.4 % (4.5-13.0)  L  08/07/17  07:40    


 


Eos % (Auto)  0.4 % (0.6-7.6)  L  08/07/17  07:40    


 


Baso % (Auto)  0.2 % (0.3-1.7)  L  08/07/17  07:40    


 


Nucleat RBC Rel Count  0.0 % (0.0-0.2)   08/07/17  07:40    


 


Absolute Neuts (auto)  7.77 10^3/uL (1.70-6.50)  H  08/07/17  07:40    


 


Absolute Lymphs (auto)  1.14 10^3/uL (1.00-3.00)   08/07/17  07:40    


 


Absolute Monos (auto)  0.32 10^3/uL (0.30-0.80)   08/07/17  07:40    


 


Absolute Eos (auto)  0.04 10^3/uL (0.03-0.40)   08/07/17  07:40    


 


Absolute Basos (auto)  0.02 10^3/uL (0.02-0.10)   08/07/17  07:40    


 


Absolute Nucleated RBC  0.00 10^3/uL (0-0.01)   08/07/17  07:40    


 


Immature Gran %  0.3 % (0.0-1.1)   08/07/17  07:40    


 


Immature Gran #  0.03 10^3/uL (0.00-0.10)   08/07/17  07:40    


 


PT  14.5 SEC (12.0-15.0)   08/07/17  08:55    


 


INR  1.14  (0.83-1.16)   08/07/17  08:55    


 


APTT  26.3 SEC (23.0-38.0)   08/07/17  08:55    


 


VBG Lactic Acid  1.0 mmol/L (0.7-2.1)   08/07/17  08:32    


 


Sodium  137 mEq/L (134-144)   08/07/17  07:40    


 


Potassium  4.1 mEq/L (3.5-5.2)   08/07/17  07:40    


 


Chloride  103 mEq/L ()   08/07/17  07:40    


 


Carbon Dioxide  25 mEq/l (22-31)   08/07/17  07:40    


 


Anion Gap  9 mEq/L (8-16)   08/07/17  07:40    


 


BUN  13 mg/dL (7-23)   08/07/17  07:40    


 


Creatinine  0.9 mg/dL (0.7-1.3)   08/07/17  07:40    


 


Estimated GFR  > 60   08/07/17  07:40    


 


Glucose  123 mg/dL ()  H  08/07/17  07:40    


 


Calcium  8.8 mg/dL (8.5-10.4)   08/07/17  07:40    


 


Fl Pathologist Review  REBECCA ARELLANO MD   08/07/17  10:27    


 


CSF Tube Number  1   08/07/17  10:27    


 


CSF Appearance  CLEAR  (CLEAR)   08/07/17  10:27    


 


CSF Color  COLORLESS  (COLORLESS)   08/07/17  10:27    


 


CSF Supernatant  COLORLESS  (COLORLESS)   08/07/17  10:27    


 


CSF WBC  58 /mm3 (0-5)  H  08/07/17  10:27    


 


CSF RBC  17 /mm3 (0-0)  H  08/07/17  10:27    


 


CSF Neutrophils %  88 % (0-6)  H  08/07/17  10:27    


 


CSF Lymphocytes %  9 % (0-100)   08/07/17  10:27    


 


CSF Monos/Macrophage %  3 % (0-45)   08/07/17  10:27    


 


CSF Glucose  69 mg/dL (50-75)   08/07/17  10:27    


 


CSF Total Protein  39 mg/dL (12-60)   08/07/17  10:27    


 


CSF West Nile IgG Ab  Cancelled   08/07/17  10:26    


 


CSF West Nile IgM Ab  Cancelled   08/07/17  10:26    


 


CSF West Nile Interp  Cancelled   08/07/17  10:26    


 


HSV Source Description  Cancelled   08/07/17  10:26    


 


Herpes Simplex Culture  Cancelled   08/07/17  10:26    


 


HSV I DNA PCR  Cancelled   08/07/17  10:26    


 


HSV II DNA PCR  Cancelled   08/07/17  10:26    








Visualized and Interpreted Chest x-ray results: Yes


Chest X-Ray results: other ( hypoventilation)





Assessment & Plan


Assessment: 








 67-year-old male presents with acute headache and suspected viral encephalitis








Plan: 





1. Suspected viral encephalitis.  Acute, new problem this provider, further 

workup indicated.  Evidenced by CSF demonstrating pleocytosis with a neutrophil 

predominance, in the setting of headache, cognitive fatigue, systemic viral 

syndrome


-outside records reviewed (ED report by Dr. Bela Reveles 8/6, indicating 

patient declined LP, discharged home w/ rec ibuprofen/tylenol for suspected 

viral meningitis/syndrome)


-CSF sent for HSV, West Nile virus


-discussed with Dr. Dez Friend, consult appreciated, he advises that this may be 

secondary to enterovirus and we will not initiate empiric IV acyclovir at this 

time


-supportive care with IV fluids, antiemetics, pain control


-send respiratory viral panel, GS/Cx pending





2. Hypertension.  Chronic, continue home medication





Diet.  Regular as tolerated





Prophylaxis.  Moderate risk patient, Lovenox 40





Code.  Full





Disposition.  Anticipated discharge is uncertain, anticipated length stay is 

greater than 48 hours warranting inpatient admission status for suspected acute 

viral encephalitis requiring close monitoring and reassessment of culture 

results from lumbar puncture.

## 2017-08-08 LAB
% IMMATURE GRANULYOCYTES: 0.3 % (ref 0–1.1)
ABSOLUTE IMMATURE GRANULOCYTES: 0.03 10^3/UL (ref 0–0.1)
ABSOLUTE NRBC COUNT: 0 10^3/UL (ref 0–0.01)
ADD DIFF?: NO
ADD MORPH?: NO
ADD SCAN?: NO
ALBUMIN SERPL-MCNC: 3.2 G/DL (ref 3.5–5)
ALP SERPL-CCNC: 56 IU/L (ref 38–126)
ALT SERPL-CCNC: 36 IU/L (ref 21–72)
ANION GAP SERPL CALC-SCNC: 9 MEQ/L (ref 8–16)
AST SERPL-CCNC: 20 IU/L (ref 17–59)
ATYPICAL LYMPHOCYTE FLAG: 30 (ref 0–99)
BILIRUB SERPL-MCNC: 1.1 MG/DL (ref 0.1–1.4)
CALCIUM SERPL-MCNC: 8.1 MG/DL (ref 8.5–10.4)
CHLORIDE SERPL-SCNC: 107 MEQ/L (ref 97–110)
CO2 SERPL-SCNC: 22 MEQ/L (ref 22–31)
CREAT SERPL-MCNC: 0.9 MG/DL (ref 0.7–1.3)
CRP SERPL-MCNC: 6.7 MG/L (ref ?–10)
ERYTHROCYTE [DISTWIDTH] IN BLOOD BY AUTOMATED COUNT: 12.4 % (ref 11.5–15.2)
ERYTHROCYTE [SEDIMENTATION RATE] IN BLOOD BY WESTERGREN METHOD: 7 MM/HR (ref 0–20)
FRAGMENT RBC FLAG: 0 (ref 0–99)
GFR SERPL CREATININE-BSD FRML MDRD: > 60 ML/MIN/{1.73_M2}
GLUCOSE SERPL-MCNC: 109 MG/DL (ref 70–100)
HCT VFR BLD CALC: 37.6 % (ref 40–51)
HGB BLD-MCNC: 12.8 G/DL (ref 13.7–17.5)
LEFT SHIFT FLG: 0 (ref 0–99)
LIPEMIA HEMOLYSIS FLAG: 90 (ref 0–99)
MCH RBC BLDCO QN: 29.8 PG (ref 27.9–34.1)
MCHC RBC AUTO-ENTMCNC: 34 G/DL (ref 32.4–36.7)
MCV RBC AUTO: 87.4 FL (ref 81.5–99.8)
NRBC-AUTO%: 0 % (ref 0–0.2)
PLATELET # BLD: 228 10^3/UL (ref 150–400)
PLATELET CLUMPS FLAG: 30 (ref 0–99)
PMV BLD AUTO: 9.3 FL (ref 8.7–11.7)
POTASSIUM SERPL-SCNC: 3.8 MEQ/L (ref 3.5–5.2)
PROT SERPL-MCNC: 5.6 G/DL (ref 6.3–8.2)
RBC # BLD AUTO: 4.3 10^6/UL (ref 4.4–6.38)
SODIUM SERPL-SCNC: 138 MEQ/L (ref 134–144)

## 2017-08-08 RX ADMIN — KETOROLAC TROMETHAMINE PRN MG: 15 INJECTION, SOLUTION INTRAMUSCULAR; INTRAVENOUS at 09:17

## 2017-08-08 RX ADMIN — SODIUM CHLORIDE SCH MLS: 900 INJECTION, SOLUTION INTRAVENOUS at 03:59

## 2017-08-08 RX ADMIN — PROMETHAZINE HYDROCHLORIDE PRN MG: 25 INJECTION INTRAMUSCULAR; INTRAVENOUS at 03:56

## 2017-08-08 RX ADMIN — SODIUM CHLORIDE AND POTASSIUM CHLORIDE SCH MLS: 9; 1.49 INJECTION, SOLUTION INTRAVENOUS at 18:34

## 2017-08-08 RX ADMIN — ATORVASTATIN CALCIUM SCH MG: 20 TABLET, FILM COATED ORAL at 21:51

## 2017-08-08 RX ADMIN — KETOROLAC TROMETHAMINE PRN MG: 15 INJECTION, SOLUTION INTRAMUSCULAR; INTRAVENOUS at 15:09

## 2017-08-08 RX ADMIN — ACETAMINOPHEN PRN MG: 500 TABLET ORAL at 05:39

## 2017-08-08 RX ADMIN — ACETAMINOPHEN PRN MG: 500 TABLET ORAL at 18:25

## 2017-08-08 RX ADMIN — ENOXAPARIN SODIUM SCH MG: 100 INJECTION SUBCUTANEOUS at 09:12

## 2017-08-08 RX ADMIN — ACETAMINOPHEN PRN MG: 500 TABLET ORAL at 13:22

## 2017-08-08 RX ADMIN — KETOROLAC TROMETHAMINE PRN MG: 15 INJECTION, SOLUTION INTRAMUSCULAR; INTRAVENOUS at 21:23

## 2017-08-08 NOTE — HOSPPROG
Hospitalist Progress Note


Assessment/Plan: 


Assessment: 





 67-year-old male presents with acute headache and suspected viral meningitis





Plan: 





1. Suspected viral meningitis.  Acute, evidenced by CSF demonstrating 

pleocytosis with a neutrophil predominance, in the setting of headache, 

cognitive fatigue, systemic viral syndrome


-d/w Dr. Friend, suspected enterovirus, serologies sent, pending


-ongoing fever, repeat BCx to ensure no other bacterial cause


-remains very symptomatic w/ severe headache, responding to and receiving IV 

morphine, toradol, rec adding benadryl now


-cont IVF w/ K


-remains very immobile 2/2 discomfort, recommended PT and repositioning to 

prevent severe deconditioning


-will get ESR to eval whether it is at a critical level where TA needs to be 

considered as part of differential





2. Hypertension.  Chronic, continue home medication





Diet.  Regular as tolerated





Prophylaxis.  Moderate risk patient, Lovenox 40





Code.  Full





Disposition.  Anticipated discharge is uncertain, too symptomatic/weak to 

safely discharge home





Subjective: patient with ongoing weakness and headache, counseled patient/wife 

extensively regarding dx, supportive tx, and need to mobilize when able


Objective: 


 Vital Signs











Temp Pulse Resp BP Pulse Ox


 


 36.9 C   66   18   158/78 H  96 


 


 08/08/17 14:45  08/08/17 14:45  08/08/17 14:45  08/08/17 14:45  08/08/17 14:45








 Microbiology











 08/07/17 18:10 Respiratory Panel (PCR) - Final





 Nasal, Sinus - [place] Viral Transport    No Organism Detected








 Laboratory Results





 08/08/17 05:31 





 08/08/17 05:31 





 











 08/07/17 08/08/17 08/09/17





 05:59 05:59 05:59


 


Intake Total  3050 


 


Output Total  525 


 


Balance  2525 








 











PT  14.5 SEC (12.0-15.0)   08/07/17  08:55    


 


INR  1.14  (0.83-1.16)   08/07/17  08:55    














- Time Spent With Patient


Time Spent with Patient: greater than 35 minutes


Time Spent with Patient: Greater than 35 minutes spent on this patients care, 

greater than 50% of time spent counseling, educating, and coordinating care 

regarding the above mentioned plan.





- Physical Exam


Constitutional: no apparent distress, appears nourished, uncomfortable, No not 

in pain (moderate headache)


Eyes: PERRL, anicteric sclera, EOMI


Cardiovascular: regular rate and rhythym, no murmur, rub, or gallop


Respiratory: no respiratory distress, no rales or rhonchi, clear to auscultation


Gastrointestinal: normoactive bowel sounds, soft, non-tender abdomen, no 

palpable masses


Neurologic: AAOx3, sensation intact bilaterally, CN II-XII Intact, No weakness


Psychiatric: not anxious, flat affect, other (lethargic but arousable), No 

agitated





ICD10 Worksheet


Patient Problems: 


 Problems











Problem Status Onset


 


Headache Acute  


 


Meningitis Acute

## 2017-08-08 NOTE — PCMIDPN
Assessment/Plan: 


Assessment/Plan:


* Meningitis:  Clinically with persistent headache and fever this afternoon 

which is not unexpected as part of typical clinical course.  Most likely 

etiology will be viral with considerations including enterovirus, West Nile 

virus, HSV, and VZV.  Remains without findings of encephalitis.  Continue 

supportive care with pain control and IV hydration.  Await serologic data which 

is currently pending.  Will hold off on addition of acyclovir given absence of 

encephalitis.  Discussed with patient and wife clinical findings and continued 

likelihood that etiology will be viral in nature.  Blood and CSF cultures 

remain negative.





08/08/17 15:46





Subjective: 





Patient with fever to 39.1 and headache this afternoon.  Appetite remains poor.

  No confusion.


Objective: 


 Vital Signs











Temp Pulse Resp BP Pulse Ox


 


 36.9 C   66   18   158/78 H  96 


 


 08/08/17 14:45  08/08/17 14:45  08/08/17 14:45  08/08/17 14:45  08/08/17 14:45








 Microbiology











 08/07/17 18:10 Respiratory Panel (PCR) - Final





 Nasal, Sinus - [place] Viral Transport    No Organism Detected








 Laboratory Results





 08/08/17 05:31 





 08/08/17 05:31 





 











 08/07/17 08/08/17 08/09/17





 05:59 05:59 05:59


 


Intake Total  3050 


 


Output Total  525 


 


Balance  2525 








 











ESR  7 MM/HR (0-20)   08/08/17  05:31    


 


C-Reactive Protein  6.7 mg/L (<10.0)   08/08/17  05:31    








No antibiotics


CSF and blood cultures no growth to date


CSF HSV/VZV/West Nile virus/enterovirus pending


Serum West Nile virus antibody from 8/5/2017 pending





- Physical Exam


General Appearance: alert, apparent distress (Uncomfortable due to fever and 

headache), non-toxic


EENT: No scleral icterus, No thrush, No conjunctival petechiae


Respiratory: lungs clear, No respiratory distress


Neck: No meningismus


Cardiac/Chest: regular rate, rhythm, No systolic murmur


Extremities: No inflammation


Abdomen: non-tender, No distended


Skin: No rash


Neuro/Psych: alert, No confused





ICD10 Worksheet


Patient Problems: 


 Problems











Problem Status Onset


 


Headache Acute  


 


Meningitis Acute

## 2017-08-09 LAB
% IMMATURE GRANULYOCYTES: 0.7 % (ref 0–1.1)
ABSOLUTE IMMATURE GRANULOCYTES: 0.06 10^3/UL (ref 0–0.1)
ABSOLUTE NRBC COUNT: 0 10^3/UL (ref 0–0.01)
ADD DIFF?: NO
ADD MORPH?: NO
ADD SCAN?: NO
ALBUMIN SERPL-MCNC: 3 G/DL (ref 3.5–5)
ALP SERPL-CCNC: 55 IU/L (ref 38–126)
ALT SERPL-CCNC: 32 IU/L (ref 21–72)
ANION GAP SERPL CALC-SCNC: 8 MEQ/L (ref 8–16)
AST SERPL-CCNC: 20 IU/L (ref 17–59)
ATYPICAL LYMPHOCYTE FLAG: 20 (ref 0–99)
BILIRUB SERPL-MCNC: 1.1 MG/DL (ref 0.1–1.4)
CALCIUM SERPL-MCNC: 8.4 MG/DL (ref 8.5–10.4)
CHLORIDE SERPL-SCNC: 104 MEQ/L (ref 97–110)
CO2 SERPL-SCNC: 24 MEQ/L (ref 22–31)
CREAT SERPL-MCNC: 0.9 MG/DL (ref 0.7–1.3)
ERYTHROCYTE [DISTWIDTH] IN BLOOD BY AUTOMATED COUNT: 12.4 % (ref 11.5–15.2)
FRAGMENT RBC FLAG: 0 (ref 0–99)
GFR SERPL CREATININE-BSD FRML MDRD: > 60 ML/MIN/{1.73_M2}
GLUCOSE SERPL-MCNC: 103 MG/DL (ref 70–100)
HCT VFR BLD CALC: 37.4 % (ref 40–51)
HGB BLD-MCNC: 13 G/DL (ref 13.7–17.5)
LEFT SHIFT FLG: 0 (ref 0–99)
LIPEMIA HEMOLYSIS FLAG: 90 (ref 0–99)
MCH RBC BLDCO QN: 30.4 PG (ref 27.9–34.1)
MCHC RBC AUTO-ENTMCNC: 34.8 G/DL (ref 32.4–36.7)
MCV RBC AUTO: 87.4 FL (ref 81.5–99.8)
NRBC-AUTO%: 0 % (ref 0–0.2)
PLATELET # BLD: 229 10^3/UL (ref 150–400)
PLATELET CLUMPS FLAG: 0 (ref 0–99)
PMV BLD AUTO: 9.4 FL (ref 8.7–11.7)
POTASSIUM SERPL-SCNC: 3.8 MEQ/L (ref 3.5–5.2)
PROT SERPL-MCNC: 5.7 G/DL (ref 6.3–8.2)
RBC # BLD AUTO: 4.28 10^6/UL (ref 4.4–6.38)
SODIUM SERPL-SCNC: 136 MEQ/L (ref 134–144)

## 2017-08-09 RX ADMIN — SODIUM CHLORIDE AND POTASSIUM CHLORIDE SCH MLS: 9; 1.49 INJECTION, SOLUTION INTRAVENOUS at 18:40

## 2017-08-09 RX ADMIN — ENOXAPARIN SODIUM SCH MG: 100 INJECTION SUBCUTANEOUS at 09:01

## 2017-08-09 RX ADMIN — PROMETHAZINE HYDROCHLORIDE PRN MG: 25 INJECTION INTRAMUSCULAR; INTRAVENOUS at 16:07

## 2017-08-09 RX ADMIN — KETOROLAC TROMETHAMINE PRN MG: 15 INJECTION, SOLUTION INTRAMUSCULAR; INTRAVENOUS at 18:39

## 2017-08-09 RX ADMIN — ACETAMINOPHEN PRN MG: 500 TABLET ORAL at 09:01

## 2017-08-09 RX ADMIN — KETOROLAC TROMETHAMINE PRN MG: 15 INJECTION, SOLUTION INTRAMUSCULAR; INTRAVENOUS at 11:59

## 2017-08-09 RX ADMIN — ACETAMINOPHEN PRN MG: 500 TABLET ORAL at 02:07

## 2017-08-09 RX ADMIN — ACETAMINOPHEN PRN MG: 500 TABLET ORAL at 16:06

## 2017-08-09 RX ADMIN — ATORVASTATIN CALCIUM SCH MG: 20 TABLET, FILM COATED ORAL at 21:16

## 2017-08-09 RX ADMIN — SODIUM CHLORIDE AND POTASSIUM CHLORIDE SCH MLS: 9; 1.49 INJECTION, SOLUTION INTRAVENOUS at 05:51

## 2017-08-09 RX ADMIN — KETOROLAC TROMETHAMINE PRN MG: 15 INJECTION, SOLUTION INTRAMUSCULAR; INTRAVENOUS at 05:43

## 2017-08-09 RX ADMIN — PROMETHAZINE HYDROCHLORIDE PRN MG: 25 INJECTION INTRAMUSCULAR; INTRAVENOUS at 02:16

## 2017-08-09 RX ADMIN — PROMETHAZINE HYDROCHLORIDE PRN MG: 25 INJECTION INTRAMUSCULAR; INTRAVENOUS at 18:39

## 2017-08-09 NOTE — HOSPPROG
Hospitalist Progress Note


Assessment/Plan: 





68 yo with suspected viral meningitis. New to me as of 8/9





1. Suspected viral meningitis.  Acute, evidenced by CSF demonstrating 

pleocytosis with a neutrophil predominance, in the setting of headache, 

cognitive fatigue, systemic viral syndrome


-ID following, suspected enterovirus vs other virus, serologies sent, pending 

as of 10 a.m 8/9


-ongoing fever, repeat BCx to ensure no other bacterial cause: No fever today. 

Awaiting cultures


-Not on Acyclovir per ID as no Encephalopathy


-remains very symptomatic w/ severe headache, responding to and receiving IV 

morphine and antiemetics


-cont IVF w/ K


-remains very immobile 2/2 discomfort, recommended PT and repositioning to 

prevent severe deconditioning





2. Hypertension.  Chronic, continue home medication





3. HLD: cont statin





Diet.  Regular as tolerated





Prophylaxis.  Moderate risk patient, Lovenox 40





Code.  Full





Disposition.  Anticipated discharge is uncertain, too symptomatic/weak to 

safely discharge home





Plan:


-Decrease IVF to 75mg/hr. Encourage PO. NO signs of volume overload at this time


-Cont PT, refused yesterday as was too uncomfortable. Encouraged today


-Await cultures/studies


Subjective: Still very symtomatic. HA, weakness, fatigue. Denies CP or SOB. No 

Hand or Leg swelling.


Objective: 


 Vital Signs











Temp Pulse Resp BP Pulse Ox


 


 37.2 C   72   16   113/63   94 


 


 08/09/17 07:56  08/09/17 07:56  08/09/17 07:56  08/09/17 07:56  08/09/17 07:56








 Laboratory Results





 08/09/17 04:55 





 08/09/17 04:55 





 











 08/08/17 08/09/17 08/10/17





 05:59 05:59 05:59


 


Intake Total 3050 2150 


 


Output Total 525 650 


 


Balance 2525 1500 








 











PT  14.5 SEC (12.0-15.0)   08/07/17  08:55    


 


INR  1.14  (0.83-1.16)   08/07/17  08:55    














- Physical Exam


Constitutional: no apparent distress, appears nourished, not in pain


Eyes: PERRL, EOMI


Ears, Nose, Mouth, Throat: moist mucous membranes


Cardiovascular: regular rate and rhythym, No JVD, No edema


Respiratory: no respiratory distress, no rales or rhonchi, clear to auscultation


Gastrointestinal: soft, non-tender abdomen


Skin: warm


Neurologic: AAOx3


Psychiatric: interacting appropriately, not anxious, not encephalopathic





ICD10 Worksheet


Patient Problems: 


 Problems











Problem Status Onset


 


Headache Acute  


 


Meningitis Acute

## 2017-08-09 NOTE — PCMIDPN
Assessment/Plan: 


Assessment/Plan:


* Meningitis:  Continued headache with decreased temperature today.  Continue 

to suspect viral etiology with West Nile virus, enterovirus, HSV, or VZV all 

remaining considerations.  Spoke with [facility] laboratory with possible 

results of serum West Nile virus antibodies from 8/5/2017 available by end of 

day.  Left my phone number for them to call me with results.  Continue with 

supportive care including pain control and IV hydration.  Blood cultures and 

CSF cultures remain negative making bacterial etiology unlikely.  Continue to 

observe off antibiotics.





Time spent, greater than 35 minutes, of which greater than half was spent in 

education/counseling/coordination of care related to meningitis, diagnostic 

considerations, and plan of care.





08/09/17 10:38





Subjective: 





Patient with persistent headache.  No episodes of confusion.  Poor appetite 

persists.


Objective: 


 Vital Signs











Temp Pulse Resp BP Pulse Ox


 


 37.2 C   72   16   113/63   94 


 


 08/09/17 07:56  08/09/17 07:56  08/09/17 07:56  08/09/17 07:56  08/09/17 07:56








 Laboratory Results





 08/09/17 04:55 





 08/09/17 04:55 





 











 08/08/17 08/09/17 08/10/17





 05:59 05:59 05:59


 


Intake Total 3050 2150 


 


Output Total 525 650 


 


Balance 2525 1500 








 











ESR  7 MM/HR (0-20)   08/08/17  05:31    


 


C-Reactive Protein  6.7 mg/L (<10.0)   08/08/17  05:31    








No antibiotic therapy


Blood cultures x2 no growth


CSF culture no growth


Serum West Nile virus antibody pending


CSF West Nile virus antibody, enterovirus PCR, HSV PCR, and VZV PCR all pending





- Physical Exam


General Appearance: alert, apparent distress (Fatigued appearance), non-toxic


EENT: PERRL/EOMI, pharynx normal, No conjunctival petechiae


Respiratory: lungs clear, No respiratory distress


Neck: No meningismus


Cardiac/Chest: regular rate, rhythm, No systolic murmur


Extremities: No inflammation


Abdomen: non-tender, No distended


Skin: No rash, No embolic lesions


Neuro/Psych: alert, oriented x 3, other (Able to follow commands and do simple 

math without difficulty)





ICD10 Worksheet


Patient Problems: 


 Problems











Problem Status Onset


 


Headache Acute  


 


Meningitis Acute

## 2017-08-10 LAB
% IMMATURE GRANULYOCYTES: 0.3 % (ref 0–1.1)
ABSOLUTE IMMATURE GRANULOCYTES: 0.02 10^3/UL (ref 0–0.1)
ABSOLUTE NRBC COUNT: 0 10^3/UL (ref 0–0.01)
ADD DIFF?: NO
ADD MORPH?: NO
ADD SCAN?: NO
ATYPICAL LYMPHOCYTE FLAG: 40 (ref 0–99)
ERYTHROCYTE [DISTWIDTH] IN BLOOD BY AUTOMATED COUNT: 12.3 % (ref 11.5–15.2)
FRAGMENT RBC FLAG: 0 (ref 0–99)
HCT VFR BLD CALC: 34.9 % (ref 40–51)
HGB BLD-MCNC: 12.1 G/DL (ref 13.7–17.5)
LEFT SHIFT FLG: 0 (ref 0–99)
LIPEMIA HEMOLYSIS FLAG: 90 (ref 0–99)
Lab: (no result)
MCH RBC BLDCO QN: 30.2 PG (ref 27.9–34.1)
MCHC RBC AUTO-ENTMCNC: 34.7 G/DL (ref 32.4–36.7)
MCV RBC AUTO: 87 FL (ref 81.5–99.8)
NRBC-AUTO%: 0 % (ref 0–0.2)
PLATELET # BLD: 225 10^3/UL (ref 150–400)
PLATELET CLUMPS FLAG: 0 (ref 0–99)
PMV BLD AUTO: 9.2 FL (ref 8.7–11.7)
RBC # BLD AUTO: 4.01 10^6/UL (ref 4.4–6.38)
SPECIMEN SOURCE: (no result)
VARICELLA ZOSTER: NEGATIVE

## 2017-08-10 RX ADMIN — KETOROLAC TROMETHAMINE PRN MG: 15 INJECTION, SOLUTION INTRAMUSCULAR; INTRAVENOUS at 18:06

## 2017-08-10 RX ADMIN — ATORVASTATIN CALCIUM SCH: 20 TABLET, FILM COATED ORAL at 20:27

## 2017-08-10 RX ADMIN — POLYETHYLENE GLYCOL 3350 SCH GM: 17 POWDER, FOR SOLUTION ORAL at 16:37

## 2017-08-10 RX ADMIN — PROMETHAZINE HYDROCHLORIDE PRN MG: 25 INJECTION INTRAMUSCULAR; INTRAVENOUS at 16:25

## 2017-08-10 RX ADMIN — ACETAMINOPHEN PRN MG: 500 TABLET ORAL at 08:48

## 2017-08-10 RX ADMIN — SODIUM CHLORIDE AND POTASSIUM CHLORIDE SCH MLS: 9; 1.49 INJECTION, SOLUTION INTRAVENOUS at 17:54

## 2017-08-10 RX ADMIN — ENOXAPARIN SODIUM SCH MG: 100 INJECTION SUBCUTANEOUS at 08:54

## 2017-08-10 RX ADMIN — PROMETHAZINE HYDROCHLORIDE PRN MG: 25 INJECTION INTRAMUSCULAR; INTRAVENOUS at 05:14

## 2017-08-10 RX ADMIN — ACETAMINOPHEN PRN MG: 500 TABLET ORAL at 17:46

## 2017-08-10 RX ADMIN — PROMETHAZINE HYDROCHLORIDE PRN MG: 25 INJECTION INTRAMUSCULAR; INTRAVENOUS at 06:45

## 2017-08-10 RX ADMIN — SODIUM CHLORIDE AND POTASSIUM CHLORIDE SCH MLS: 9; 1.49 INJECTION, SOLUTION INTRAVENOUS at 04:51

## 2017-08-10 NOTE — HOSPPROG
Hospitalist Progress Note


Assessment/Plan: 





68 yo with viral meningitis likely due to West Nile (IgM +, IgG- on 8/10). 

Still very symptomatic with decreased oral intake and generalized weakness.





1. Suspected viral meningitis.  Acute, evidenced by CSF demonstrating 

pleocytosis with a neutrophil predominance, in the setting of headache, 

cognitive fatigue, systemic viral syndrome. Likely due to West Nile per test 

results obtained 8/10


-ID following --> supportive care


-Not on Acyclovir per ID as no Encephalopathy


-remains very symptomatic w/ severe headache, responding to and receiving IV 

morphine and antiemetics


-cont IVF w/ K, although will decrease slightly today


-working with PT but due to sx's this has been limited. Encouraged PT





2. Hypertension.  Chronic, continue home medication





3. HLD: cont statin





Diet.  Regular as tolerated





Prophylaxis.  Moderate risk patient, Lovenox 40





Code.  Full





Disposition.  Anticipated discharge is uncertain, too symptomatic/weak to 

safely discharge home. Supportive Care. IVF as need, decreasing today. PT. May 

need Rehab. D/W nurse who will d/w CM. will also order OT.





D/W ID





Subjective: Still with HA, still very weak, Afebrile. Limited work with PT. 

Denies UE/LE swelling or edema. PO intake a little better overnight.


Objective: 


 Vital Signs











Temp Pulse Resp BP Pulse Ox


 


 37.4 C   88   19   128/84 H  3 L


 


 08/10/17 08:10  08/10/17 08:10  08/10/17 08:10  08/10/17 08:10  08/10/17 08:10








 Laboratory Results





 08/10/17 04:47 





 08/09/17 04:55 





 











 08/09/17 08/10/17 08/11/17





 05:59 05:59 05:59


 


Intake Total 2150 1818 900


 


Output Total 650 751 500


 


Balance 1500 1067 400








 











PT  14.5 SEC (12.0-15.0)   08/07/17  08:55    


 


INR  1.14  (0.83-1.16)   08/07/17  08:55    














- Physical Exam


Constitutional: no apparent distress, appears nourished, not in pain


Eyes: PERRL, EOMI


Ears, Nose, Mouth, Throat: moist mucous membranes, hearing normal


Cardiovascular: regular rate and rhythym, no murmur, rub, or gallop, No JVD, No 

bradycardia, No edema


Respiratory: no respiratory distress, no rales or rhonchi, clear to auscultation


Gastrointestinal: normoactive bowel sounds, soft, non-tender abdomen, No 

tenderness


Skin: warm


Musculoskeletal: generalized weakness


Neurologic: AAOx3


Psychiatric: interacting appropriately, not anxious, not encephalopathic, 

thought process linear





ICD10 Worksheet


Patient Problems: 


 Problems











Problem Status Onset


 


Headache Acute  


 


Meningitis Acute

## 2017-08-10 NOTE — PCMIDPN
Assessment/Plan: 


Assessment/Plan:


* West Nile Virus Meningitis/neuro invasive disease:  Serum West Nile virus IgM 

positive consistent with acute West Nile virus meningitis/neuro invasive 

disease.  Persistent headache and profound malaise.  Question if subtle 

cogwheeling present in right upper extremity.  No significant rigidity however.

  Continue supportive care with IV fluids and pain control.  Will check serum 

IgG level to ensure no evidence of hypogammaglobulinemia although no history of 

recurrent infections previously.





Clinical findings and plan discussed with patient and wife.





08/10/17 09:34





Subjective: 





Patient complains of headache.  Temperature has been down last 24 hours.  Poor 

oral intake.


Objective: 


 Vital Signs











Temp Pulse Resp BP Pulse Ox


 


 37.4 C   88   19   128/84 H  3 L


 


 08/10/17 08:10  08/10/17 08:10  08/10/17 08:10  08/10/17 08:10  08/10/17 08:10








 Laboratory Results





 08/10/17 04:47 





 08/09/17 04:55 





 











 08/09/17 08/10/17 08/11/17





 05:59 05:59 05:59


 


Intake Total 2150 1818 900


 


Output Total 650 751 500


 


Balance 1500 1067 400








 











ESR  7 MM/HR (0-20)   08/08/17  05:31    


 


C-Reactive Protein  6.7 mg/L (<10.0)   08/08/17  05:31    








No antibiotics


Serum West Nile virus IgM positive, IgG negative (8/5/2017 drawn at time of ED 

visit)


CSF West Nile virus antibodies pending


CSF HSV/VZV/enterovirus PCR negative


Blood and CSF cultures no growth





- Physical Exam


General Appearance: apparent distress (Pain related to headache), non-toxic


EENT: No scleral icterus, No thrush


Respiratory: lungs clear, No respiratory distress


Cardiac/Chest: regular rate, rhythm


Extremities: No inflammation


Abdomen: non-tender, No distended


Skin: No rash


Neuro/Psych: oriented x 3, other (Question subtle cogwheeling of right upper 

extremity without giselle rigidity), No confused





ICD10 Worksheet


Patient Problems: 


 Problems











Problem Status Onset


 


Headache Acute  


 


Meningitis Acute

## 2017-08-11 LAB
ALBUMIN SERPL-MCNC: 2.8 G/DL (ref 3.5–5)
ALP SERPL-CCNC: 50 IU/L (ref 38–126)
ALT SERPL-CCNC: 30 IU/L (ref 21–72)
ANION GAP SERPL CALC-SCNC: 9 MEQ/L (ref 8–16)
AST SERPL-CCNC: 18 IU/L (ref 17–59)
BILIRUB SERPL-MCNC: 1.1 MG/DL (ref 0.1–1.4)
CALCIUM SERPL-MCNC: 8.4 MG/DL (ref 8.5–10.4)
CHLORIDE SERPL-SCNC: 103 MEQ/L (ref 97–110)
CO2 SERPL-SCNC: 25 MEQ/L (ref 22–31)
CREAT SERPL-MCNC: 0.8 MG/DL (ref 0.7–1.3)
GFR SERPL CREATININE-BSD FRML MDRD: > 60 ML/MIN/{1.73_M2}
GLUCOSE SERPL-MCNC: 91 MG/DL (ref 70–100)
POTASSIUM SERPL-SCNC: 3.9 MEQ/L (ref 3.5–5.2)
PROT SERPL-MCNC: 5.2 G/DL (ref 6.3–8.2)
SODIUM SERPL-SCNC: 137 MEQ/L (ref 134–144)

## 2017-08-11 RX ADMIN — ENOXAPARIN SODIUM SCH MG: 100 INJECTION SUBCUTANEOUS at 08:54

## 2017-08-11 RX ADMIN — KETOROLAC TROMETHAMINE PRN MG: 15 INJECTION, SOLUTION INTRAMUSCULAR; INTRAVENOUS at 16:07

## 2017-08-11 RX ADMIN — PROMETHAZINE HYDROCHLORIDE PRN MG: 25 INJECTION INTRAMUSCULAR; INTRAVENOUS at 09:08

## 2017-08-11 RX ADMIN — KETOROLAC TROMETHAMINE PRN MG: 15 INJECTION, SOLUTION INTRAMUSCULAR; INTRAVENOUS at 08:52

## 2017-08-11 RX ADMIN — ATORVASTATIN CALCIUM SCH: 20 TABLET, FILM COATED ORAL at 21:22

## 2017-08-11 RX ADMIN — ACETAMINOPHEN PRN MG: 500 TABLET ORAL at 19:43

## 2017-08-11 RX ADMIN — ONDANSETRON PRN MG: 2 SOLUTION INTRAMUSCULAR; INTRAVENOUS at 19:43

## 2017-08-11 RX ADMIN — ONDANSETRON PRN MG: 2 SOLUTION INTRAMUSCULAR; INTRAVENOUS at 10:55

## 2017-08-11 RX ADMIN — SODIUM CHLORIDE AND POTASSIUM CHLORIDE SCH MLS: 9; 1.49 INJECTION, SOLUTION INTRAVENOUS at 08:51

## 2017-08-11 RX ADMIN — POLYETHYLENE GLYCOL 3350 SCH: 17 POWDER, FOR SOLUTION ORAL at 09:11

## 2017-08-11 RX ADMIN — KETOROLAC TROMETHAMINE PRN MG: 15 INJECTION, SOLUTION INTRAMUSCULAR; INTRAVENOUS at 00:06

## 2017-08-11 NOTE — PCMIDPN
Assessment/Plan: 





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 Continue 

supportive care. Long discussion about pathogenesis of WNV and expected 

clinical course.  IgG level normal, no clear underlying immune compromise.  





Subjective: 





patient does not want to go to rehab, hopes to go straight home


Objective: 


 Vital Signs











Temp Pulse Resp BP Pulse Ox


 


 37.1 C   87   14   122/74 H  92 


 


 08/11/17 11:18  08/11/17 11:18  08/11/17 11:18  08/11/17 11:18  08/11/17 11:18








 Laboratory Results





 08/10/17 04:47 





 08/11/17 05:05 





 











 08/10/17 08/11/17 08/12/17





 05:59 05:59 05:59


 


Intake Total 1818 2667 


 


Output Total 751 2475 900


 


Balance 1067 192 -900








 











ESR  7 MM/HR (0-20)   08/08/17  05:31    


 


C-Reactive Protein  6.7 mg/L (<10.0)   08/08/17  05:31    








 Laboratory Tests











  08/05/17





  13:10


 


West Nile Virus IgG Ab  Negative


 


West Nile Virus IgM Ab  Positive H


 


West Nile Interp  See Comments














- Physical Exam


General Appearance: alert, no apparent distress


EENT: pale conjunctiva, No scleral icterus


Respiratory: lungs clear, No accessory muscle use


Cardiac/Chest: regular rate, rhythm


Abdomen: non-tender, soft


Skin: No rash


Neuro/Psych: alert, oriented x 3, depressed affect, other (UE tremulous B), No 

no motor/sensory deficits





- Time Spent With Patient


Time Spent with Patient: greater than 35 minutes (reviewed pathogenesis of WNV)


Time Spent with Patient: Greater than 35 minutes spent on this patients care, 

greater than 50% of time spent counseling, educating, and coordinating care 

regarding the above mentioned plan.





ICD10 Worksheet


Patient Problems: 


 Problems











Problem Status Onset


 


Headache Acute  


 


Meningitis Acute

## 2017-08-11 NOTE — HOSPPROG
Hospitalist Progress Note


Assessment/Plan: 





66 yo with viral meningitis likely due to West Nile (IgM +, IgG- on 8/10). 

Still very symptomatic with decreased oral intake and generalized weakness. 

Starting to feel better. Has been refusing PT





1. viral meningitis due to West Nile


-ID following --> supportive care


-Not on Acyclovir per ID as no Encephalopathy


-remains very symptomatic w/ severe headache, responding to and receiving IV 

morphine and antiemetics


-cont IVF w/ K, although will decrease slightly today. Decrease to 50ml/hr


-working with PT but due to sx's this has been limited. Encouraged PT





2. Hypertension.  Chronic, continue home medication





3. HLD: cont statin





Diet.  Regular as tolerated





Prophylaxis.  Moderate risk patient, Lovenox 40





Code.  Full





Disposition.  Keep inpatient. too symptomatic/weak to safely discharge home. 

Cont suupportive care. Cont IVF, but will decrease today. Cont PT. May need 

Rehab. This was d/w CM today








Subjective: Starting to feed better. No HA. Still with decreased oral intake, 

but improving. Afebrile. Still weak.


Objective: 


 Vital Signs











Temp Pulse Resp BP Pulse Ox


 


 37.1 C   87   14   122/74 H  92 


 


 08/11/17 11:18  08/11/17 11:18  08/11/17 11:18  08/11/17 11:18  08/11/17 11:18








 Laboratory Results





 08/10/17 04:47 





 08/11/17 05:05 





 











 08/10/17 08/11/17 08/12/17





 05:59 05:59 05:59


 


Intake Total 1818 2667 


 


Output Total 751 8405 900


 


Balance 1067 192 -900








 











PT  14.5 SEC (12.0-15.0)   08/07/17  08:55    


 


INR  1.14  (0.83-1.16)   08/07/17  08:55    














- Physical Exam


Constitutional: no apparent distress, appears nourished, not in pain


Eyes: PERRL, EOMI


Ears, Nose, Mouth, Throat: moist mucous membranes, hearing normal


Cardiovascular: regular rate and rhythym, no murmur, rub, or gallop, No edema


Respiratory: no respiratory distress, no rales or rhonchi, clear to auscultation


Gastrointestinal: normoactive bowel sounds, soft, non-tender abdomen, no 

palpable masses


Skin: warm


Musculoskeletal: generalized weakness


Neurologic: AAOx3


Psychiatric: interacting appropriately, not anxious, not encephalopathic





ICD10 Worksheet


Patient Problems: 


 Problems











Problem Status Onset


 


Headache Acute  


 


Meningitis Acute

## 2017-08-12 RX ADMIN — ACETAMINOPHEN PRN MG: 500 TABLET ORAL at 20:36

## 2017-08-12 RX ADMIN — SODIUM CHLORIDE AND POTASSIUM CHLORIDE SCH MLS: 9; 1.49 INJECTION, SOLUTION INTRAVENOUS at 00:16

## 2017-08-12 RX ADMIN — ATORVASTATIN CALCIUM SCH: 20 TABLET, FILM COATED ORAL at 21:50

## 2017-08-12 RX ADMIN — KETOROLAC TROMETHAMINE PRN MG: 15 INJECTION, SOLUTION INTRAMUSCULAR; INTRAVENOUS at 06:35

## 2017-08-12 RX ADMIN — ONDANSETRON PRN MG: 2 SOLUTION INTRAMUSCULAR; INTRAVENOUS at 20:37

## 2017-08-12 RX ADMIN — POLYETHYLENE GLYCOL 3350 SCH GM: 17 POWDER, FOR SOLUTION ORAL at 09:26

## 2017-08-12 RX ADMIN — ENOXAPARIN SODIUM SCH MG: 100 INJECTION SUBCUTANEOUS at 09:26

## 2017-08-12 RX ADMIN — KETOROLAC TROMETHAMINE PRN MG: 15 INJECTION, SOLUTION INTRAMUSCULAR; INTRAVENOUS at 16:44

## 2017-08-12 NOTE — HOSPPROG
Hospitalist Progress Note


Assessment/Plan: 





*  West Nile encephalitis


   -cognition worsening per wife, now with UE tremor


   -continue supportive care


   -in addition to PT, consult OT and ST for cognition


*  Very poor PO intake


   -encourage PO


   -calorie count


*  HTN


   -continue home meds


*  Hyperlipidemia


   -statin














Subjective: According to wife, cognition is worsening.   Now with new UE tremor


Objective: 


 Vital Signs











Temp Pulse Resp BP Pulse Ox


 


 36.8 C   70   16   133/90 H  94 


 


 08/12/17 11:51  08/12/17 11:51  08/12/17 11:51  08/12/17 11:51  08/12/17 11:51








 Laboratory Results





 08/10/17 04:47 





 08/11/17 05:05 





 











 08/11/17 08/12/17 08/13/17





 05:59 05:59 05:59


 


Intake Total 2667 1870 


 


Output Total 2475 2600 1100


 


Balance 192 -730 -1100








 











PT  14.5 SEC (12.0-15.0)   08/07/17  08:55    


 


INR  1.14  (0.83-1.16)   08/07/17  08:55    








Head CT - no change


discussed with Dr. Mark - [place] when rehab needs clear








- Physical Exam


Constitutional: no apparent distress, appears nourished, not in pain


Cardiovascular: regular rate and rhythym, no murmur, rub, or gallop


Respiratory: no respiratory distress, no rales or rhonchi, clear to auscultation


Gastrointestinal: normoactive bowel sounds, soft, non-tender abdomen, no 

palpable masses


Skin: no rashes or abrasions, no fluctuance, no induration


Neurologic: AAOx3, sensation intact bilaterally


Psychiatric: interacting appropriately, encephalopathic, flat affect, other (

very flat but answers appropriately when questioned), No agitated





ICD10 Worksheet


Patient Problems: 


 Problems











Problem Status Onset


 


Headache Acute  


 


Meningitis Acute

## 2017-08-12 NOTE — PCMIDPN
Assessment/Plan: 





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 

clinical improvement.  Continue supportive care.  No evidence of underlying 

immune compromise.


--will follow peripherally


--discharge based on opinion from physical therapy .


--stressed the importance of physical therapy


Subjective: 





Feeling better today.  Sat up in a chair for 3 hours today.


Objective: 


 Vital Signs











Temp Pulse Resp BP Pulse Ox


 


 36.8 C   70   16   133/90 H  94 


 


 08/12/17 11:51  08/12/17 11:51  08/12/17 11:51  08/12/17 11:51  08/12/17 11:51








 Laboratory Results





 08/10/17 04:47 





 08/11/17 05:05 





 











 08/11/17 08/12/17 08/13/17





 05:59 05:59 05:59


 


Intake Total 2660 1870 


 


Output Total 2076 2600 1100


 


Balance 192 -730 -1100








 











ESR  7 MM/HR (0-20)   08/08/17  05:31    


 


C-Reactive Protein  6.7 mg/L (<10.0)   08/08/17  05:31    














- Physical Exam


General Appearance: alert, no apparent distress


EENT: pale conjunctiva, No scleral icterus


Respiratory: No accessory muscle use


Extremities: other (Persistent upper extremity tremulousness)


Skin: No rash


Neuro/Psych: alert, oriented x 3, depressed affect





ICD10 Worksheet


Patient Problems: 


 Problems











Problem Status Onset


 


Headache Acute  


 


Meningitis Acute

## 2017-08-13 LAB
% IMMATURE GRANULYOCYTES: 0.2 % (ref 0–1.1)
ABSOLUTE IMMATURE GRANULOCYTES: 0.01 10^3/UL (ref 0–0.1)
ABSOLUTE NRBC COUNT: 0 10^3/UL (ref 0–0.01)
ADD DIFF?: NO
ADD MORPH?: NO
ADD SCAN?: NO
ANION GAP SERPL CALC-SCNC: 10 MEQ/L (ref 8–16)
ATYPICAL LYMPHOCYTE FLAG: 40 (ref 0–99)
CALCIUM SERPL-MCNC: 8.9 MG/DL (ref 8.5–10.4)
CHLORIDE SERPL-SCNC: 102 MEQ/L (ref 97–110)
CO2 SERPL-SCNC: 27 MEQ/L (ref 22–31)
CREAT SERPL-MCNC: 0.9 MG/DL (ref 0.7–1.3)
ERYTHROCYTE [DISTWIDTH] IN BLOOD BY AUTOMATED COUNT: 12.4 % (ref 11.5–15.2)
FRAGMENT RBC FLAG: 0 (ref 0–99)
GFR SERPL CREATININE-BSD FRML MDRD: > 60 ML/MIN/{1.73_M2}
GLUCOSE SERPL-MCNC: 102 MG/DL (ref 70–100)
HCT VFR BLD CALC: 36.7 % (ref 40–51)
HGB BLD-MCNC: 12.8 G/DL (ref 13.7–17.5)
LEFT SHIFT FLG: 0 (ref 0–99)
LIPEMIA HEMOLYSIS FLAG: 90 (ref 0–99)
MCH RBC BLDCO QN: 30 PG (ref 27.9–34.1)
MCHC RBC AUTO-ENTMCNC: 34.9 G/DL (ref 32.4–36.7)
MCV RBC AUTO: 85.9 FL (ref 81.5–99.8)
NRBC-AUTO%: 0 % (ref 0–0.2)
PLATELET # BLD: 280 10^3/UL (ref 150–400)
PLATELET CLUMPS FLAG: 0 (ref 0–99)
PMV BLD AUTO: 9.2 FL (ref 8.7–11.7)
POTASSIUM SERPL-SCNC: 4 MEQ/L (ref 3.5–5.2)
RBC # BLD AUTO: 4.27 10^6/UL (ref 4.4–6.38)
SODIUM SERPL-SCNC: 139 MEQ/L (ref 134–144)

## 2017-08-13 RX ADMIN — ATORVASTATIN CALCIUM SCH MG: 20 TABLET, FILM COATED ORAL at 20:22

## 2017-08-13 RX ADMIN — ACETAMINOPHEN PRN MG: 500 TABLET ORAL at 20:22

## 2017-08-13 RX ADMIN — ACETAMINOPHEN PRN MG: 500 TABLET ORAL at 02:59

## 2017-08-13 RX ADMIN — ONDANSETRON PRN MG: 2 SOLUTION INTRAMUSCULAR; INTRAVENOUS at 05:28

## 2017-08-13 RX ADMIN — POLYETHYLENE GLYCOL 3350 SCH GM: 17 POWDER, FOR SOLUTION ORAL at 10:45

## 2017-08-13 RX ADMIN — ONDANSETRON PRN MG: 2 SOLUTION INTRAMUSCULAR; INTRAVENOUS at 00:31

## 2017-08-13 RX ADMIN — ENOXAPARIN SODIUM SCH MG: 100 INJECTION SUBCUTANEOUS at 10:45

## 2017-08-13 NOTE — GCON
[f 
rep st]



                                                                    CONSULTATION





NEUROLOGY CONSULTATION



REFERRING PHYSICIAN:  Alyce Hoyt MD





CHIEF COMPLAINT:  West Nile virus meningoencephalitis.



HISTORY OF PRESENT ILLNESS:  The patient is a very pleasant 67-year-old 
gentleman, who is mostly retired from general [employer].  He was in [place] 
by a reservoir for a period of time in July, and had mosquito exposure there 
and at home.  At the end of July, around July 25th, he began having some vague 
fatigue, and nausea type symptoms, which progressed into a more fulminant viral 
syndrome by August 5th.  He ultimately was admitted to the hospital, and found 
to have abnormal CSF, with a white blood cell count of 58 to 84, based on the 
tube number with a neutrophilic predominance.  His serology was consistent with 
acute West Nile virus infection.  Head CTs have been negative.  He has had a 
slow course of improvement with some ups and downs related to how much he has 
ate or drank.  Yesterday, he did not eat or drink almost anything, and felt 
fatigued and shaky.  Today, he is back to baseline, feeling better that he has 
eaten.  He has had no seizures.



REVIEW OF SYSTEMS:  A 10-point review of system was performed and only 
pertinent to the HPI. 



Past medical history, social history, family history, home medications, 
allergies, please see Dr. Solitario's history and physical from August 7, 2017.



PHYSICAL EXAM:  VITAL SIGNS:  Blood pressure is 118/72, he is afebrile at 36.4, 
satting at 94%, heart rate 67.  

GENERAL:  In no acute distress.  Very pleasant.  

NEUROLOGIC:  Higher mental function:  He names 5/5.  Follows commands 5/5, 
repeats 5/5.  No aphasia.  He is fairly lucid and oriented.  Cranial nerve exam
:  Normal 2 through 7.  Motor exam:  Normal strength, tone and reflexes 
throughout.  Sensory exam normal to light touch throughout.  Coordination: 

Normal in upper and lower extremities.  The patient walked around the unit this 
morning. 



seventy total minutes floor time today, this included reviewing extensive 
hospital medicine, infectious disease records, along with laboratories and 
imaging.  That also included direct counseling with the patient and 
coordination of care.



IMPRESSION/PLAN:  



1. West Nile virus meningoencephalitis.  



I had a long discussion with the patient and his wife regarding encephalitis, 
recovery and prognosis.  There is no specific antiviral treatment indicated.  
It is essentially supportive care.  He fortunately does not have any signs or 
symptoms of flaccid paralysis (another neurologic syndrome that occurs due to 
West Nile virus).  He has normal strength and reflexes throughout. 



I counseled him that he will have some degree of recovery from his encephalitis
, in terms of the cognitive slowing they have noticed.  I recommend he have 
some cognitive therapy either as an inpatient or outpatient.  I will defer to 
my colleagues in speech language therapy on what they recommend.  



He likely would do well as an outpatient getting cognitive therapy.  No further 
recommendations now.  All of the above is discussed at great length with the 
family.  We will sign off and follow up as needed.  We change services 
tomorrow.  Please do not hesitate to reconsult the neurology service, if there 
are any further questions or changes in neurologic status.  



Lastly, I have counseled the patient and his wife, if he develops any 
paroxysmal spells suspicious of seizures, they are to contact my office as soon 
as possible to set up an outpatient neurology followup. 





Job #:  [id number]

MTDD

## 2017-08-13 NOTE — HOSPPROG
Hospitalist Progress Note


Assessment/Plan: 





*  West Nile meningo-encephalitis


   -continue supportive care - much better today


   -scored 22/30 on cognitive testing - continue OP ST


   -if continues to improve - home with HH in am


   -seizure risk - watch clinically


*  Hypoxia


   -suspect atelectasis - start IS


   -if persistent consider w/u for PE - check Ddimer


*  HTN


   -continue norvasc


*  Hyperlipidemia


   -statin














Subjective: Much better today.   Stronger.   Conversing normally.   Upper 

extremity tremor reduced.


Objective: 


 Vital Signs











Temp Pulse Resp BP Pulse Ox


 


 36.8 C   87   18   141/84 H  91 L


 


 08/13/17 13:34  08/13/17 13:34  08/13/17 13:34  08/13/17 13:34  08/13/17 13:34








 Laboratory Results





 08/13/17 04:09 





 08/13/17 04:09 





 











 08/12/17 08/13/17 08/14/17





 05:59 05:59 05:59


 


Intake Total 1870 350 


 


Output Total 2600 1100 


 


Balance -730 -750 








 











PT  14.5 SEC (12.0-15.0)   08/07/17  08:55    


 


INR  1.14  (0.83-1.16)   08/07/17  08:55    








Case d/w Dr. Martines - no other options other than supportive care.   watch for 

seizures.


CXR - poor inspiration, no PNA





- Physical Exam


Constitutional: no apparent distress, appears nourished, not in pain


Cardiovascular: regular rate and rhythym, no murmur, rub, or gallop


Respiratory: no respiratory distress, no rales or rhonchi, clear to auscultation


Gastrointestinal: normoactive bowel sounds, soft, non-tender abdomen, no 

palpable masses


Skin: no rashes or abrasions, no fluctuance, no induration


Neurologic: AAOx3, sensation intact bilaterally


Psychiatric: interacting appropriately, not anxious, not encephalopathic, 

thought process linear





ICD10 Worksheet


Patient Problems: 


 Problems











Problem Status Onset


 


Headache Acute  


 


Meningitis Acute

## 2017-08-14 VITALS — RESPIRATION RATE: 16 BRPM

## 2017-08-14 VITALS
TEMPERATURE: 98.2 F | OXYGEN SATURATION: 91 % | DIASTOLIC BLOOD PRESSURE: 75 MMHG | HEART RATE: 98 BPM | SYSTOLIC BLOOD PRESSURE: 122 MMHG

## 2017-08-14 RX ADMIN — POLYETHYLENE GLYCOL 3350 SCH: 17 POWDER, FOR SOLUTION ORAL at 08:52

## 2017-08-14 RX ADMIN — ENOXAPARIN SODIUM SCH MG: 100 INJECTION SUBCUTANEOUS at 07:50

## 2017-08-14 NOTE — PDIAF
- Diagnosis


Diagnosis: West Nile Virus


Code Status: Full Code





- Medication Management


Discharge Medications: 


 Medications to Continue on Transfer





amLODIPine BESYLATE [Norvasc 5 mg (*)] 5 mg PO HS 08/05/17 [Last Taken 08/06/17]


Acyclovir 400 mg PO DAILY PRN 08/07/17 [Last Taken Unknown]


Atorvastatin Calcium [Lipitor 20 mg (*)] 20 mg PO HS 08/07/17 [Last Taken 08/06/ 17]


Herbals/Supplements -Info Only 1 ea PO DAILY 08/07/17 [Last Taken Unknown]


Acetaminophen [Tylenol  mg (*)] 500 - 1,000 mg PO Q6HRS PRN #0 tab 08/14/ 17 [Last Taken Unknown]


Ondansetron Odt [Zofran Odt 4 mg (*)] 4 - 8 mg PO Q4HRS PRN #40 tab 08/14/17 [

Last Taken Unknown]








Long Term Antibiotics: NA


Discharge Medications: Refer to the Discharge Home Medication list for PRN 

reason.


PICC Care - Routine: N/A





- Orders


Services needed: Home Care, Registered Nurse, Physical Therapy, Occupational 

Therapy, Speech Language Pathologist (Cognitive/Language therapy)


Home Care Face to Face: I certify that this patient was under my care and that 

I had the required face-to-face encounter meeting the encounter requirements on 

the discharge day.  My findings support the fact that the patient is homebound 

as defined in CMS Chapter 7 Medicare Benefits Manual 30.1.1, The condition of 

the patient is such that there exists a normal inability to leave home and 

consequently, leaving home would require a considerable and taxing effort.


Oxygen: NA


Diet Recommendation: no restrictions on diet


Gonzalez: Not applicable


Activity/Weight Bearing Restrictions: as tolerates





- Follow Up Care


Current Providers and Referrals: 


Siddharth Martines MD [Medical Doctor] - follow up in 1 week


Milka Mark MD [Medical Doctor] - follow up in 2 weeks


Lou Santacruz MD [Primary Care Provider] - As per Instructions (in 2-4 weeks)

## 2017-08-14 NOTE — PDDCSUM
Discharge Summary


Discharge Summary: 


DISCHARGE SUMMARY





FOLLOW-UP ITEMS: 


Reassess motor and cognitive skills, outpatient driving test





DATE OF ADMISSION:  8/7/2017


DATE OF DISCHARGE:  8/14/2017





DISCHARGE DIAGNOSES:


1. Acute West Nile virus meningoencephalitis


2. Acute atelectasis


3. Chronic hypertension





CONSULTATIONS:


Neurology, Infectious Disease





PROCEDURES / IMAGING:


Head CT within normal limits, CT angiogram of chest demonstrating no pulmonary 

embolism, atelectasis





CHIEF COMPLAINT:


Acute fatigue and fever





SUBJECTIVE:


Patient continues to experience some fatigue as well as cognitive slowing





PHYSICAL EXAM ON DISCHARGE:


Systolic blood pressure is 110-140, heart rate 60, afebrile over the last 48 

hours, satting 90-92% on room air with exertion, alert awake oriented x3, pain 

level 0 10, cooperative and follows commands, lungs with faint inspiratory 

crackles in the bilateral bases, clearing with cough





LABS ON DISCHARGE:


White blood cell count normal, creatinine normal





HOSPITAL COURSE BY PROBLEM:


1. Acute West Nile virus meningoencephalitis.  Patient presented with fever, 

rigors, fatigue, cognitive slowing secondary to acute West Nile virus 

infection.  He underwent a lumbar puncture which demonstrated a lymphocyte 

predominant pleocytosis.  His West Nile virus IgM antibody from 8/5/2017 came 

back positive.  He received supportive care with IV fluids, IV pain medications

, physical occupational and cognitive therapy.  He was seen in consultation by 

Infectious Disease and Neurology.  The patient continued to experience dense 

symptoms including fatigue, headache, cognitive slowing, requiring extended 

hospitalization.  Once the symptoms were stabilized, and the patient was no 

longer experiencing headache, he was safe for discharge. He will have 

outpatient physical occupational and cognitive therapy and will have an 

outpatient driving test through either the neurology office or his primary care 

provider.


2. Atelectasis.  Acute, incentive spirometer administered, the patient was 

weaned from supplemental oxygen at time of discharge. Pulmonary embolism was 

ruled out with CT angiogram.


3. Chronic hypertension.  He was continued on his home medication.





DISCHARGE MEDICATIONS:


Please see official discharge medication reconciliation sheet in chart , as-

needed Zofran prescribed time of discharge.





DISCHARGE INSTRUCTIONS:


Please follow up with Neurology, Infectious Disease, your primary care provider.





TIME SPENT:


Greater than 30 minutes were spent on direct patient care, as well as discharge 

planning and preparation.

## 2019-03-20 ENCOUNTER — HOSPITAL ENCOUNTER (OUTPATIENT)
Dept: HOSPITAL 80 - BHFA | Age: 69
End: 2019-03-20
Attending: INTERNAL MEDICINE
Payer: COMMERCIAL

## 2019-03-20 DIAGNOSIS — R06.02: ICD-10-CM

## 2019-03-20 DIAGNOSIS — I25.10: Primary | ICD-10-CM

## 2019-03-20 PROCEDURE — 78452 HT MUSCLE IMAGE SPECT MULT: CPT

## 2019-03-20 PROCEDURE — A9500 TC99M SESTAMIBI: HCPCS

## 2019-03-20 PROCEDURE — 93017 CV STRESS TEST TRACING ONLY: CPT
